# Patient Record
Sex: MALE | Race: WHITE | Employment: FULL TIME | URBAN - METROPOLITAN AREA
[De-identification: names, ages, dates, MRNs, and addresses within clinical notes are randomized per-mention and may not be internally consistent; named-entity substitution may affect disease eponyms.]

---

## 2017-01-09 ENCOUNTER — HOSPITAL ENCOUNTER (OUTPATIENT)
Dept: NEUROLOGY | Age: 42
Discharge: HOME | End: 2017-01-09
Attending: ORTHOPAEDIC SURGERY | Admitting: ORTHOPAEDIC SURGERY

## 2017-01-09 DIAGNOSIS — G56.00 CARPAL TUNNEL SYNDROME: ICD-10-CM

## 2018-11-08 ENCOUNTER — HOSPITAL ENCOUNTER (EMERGENCY)
Age: 43
Discharge: HOME | End: 2018-11-08
Attending: EMERGENCY MEDICINE
Payer: COMMERCIAL

## 2018-11-08 VITALS
HEIGHT: 72 IN | HEART RATE: 103 BPM | BODY MASS INDEX: 21.97 KG/M2 | SYSTOLIC BLOOD PRESSURE: 124 MMHG | TEMPERATURE: 98.2 F | WEIGHT: 162.2 LBS | RESPIRATION RATE: 12 BRPM | DIASTOLIC BLOOD PRESSURE: 77 MMHG | OXYGEN SATURATION: 98 %

## 2018-11-08 DIAGNOSIS — J06.9 VIRAL URI WITH COUGH: Primary | ICD-10-CM

## 2018-11-08 PROCEDURE — 99282 EMERGENCY DEPT VISIT SF MDM: CPT

## 2018-11-08 RX ORDER — AZITHROMYCIN 250 MG/1
TABLET, FILM COATED ORAL
Qty: 1 PACKET | Refills: 0 | Status: SHIPPED | OUTPATIENT
Start: 2018-11-08 | End: 2018-11-18

## 2018-11-08 RX ORDER — PSEUDOEPHEDRINE HCL 120 MG/1
120 TABLET, FILM COATED, EXTENDED RELEASE ORAL EVERY 12 HOURS PRN
Qty: 20 TABLET | Refills: 1 | Status: SHIPPED | OUTPATIENT
Start: 2018-11-08 | End: 2018-11-15

## 2018-11-09 NOTE — ED NOTES
Pt dc/d with instructions and rx's in stable condition, ambulatory to lobby. Home per ride.       Shirley Greenberg RN  11/08/18 2004

## 2018-11-09 NOTE — ED PROVIDER NOTES
EMERGENCY DEPARTMENT PHYSICIAN DOCUMENTATION      CHIEF COMPLAINT  Cough (congestion sinus pressure )      HISTORY OF PRESENT ILLNESS  Ludivina Alberto is a 44 y.o. male with complaint of Cough (congestion sinus pressure )    Onset of symptoms 8 days ago. Sore throat is sharp, worse with swallowing, no radiation. No throat discharge. Associated with some nasal congestion and facial pressure. Cough is mildly productive, no hemoptysis. No nathaniel fevers. REVIEW OF SYSTEMS  A full 10 point Review of Systems was performed and is negative aside from pertinent positives mentioned in HPI    ALLERGIES:  Allergies   Allergen Reactions    Vicodin [Hydrocodone-Acetaminophen] Nausea And Vomiting       PAST HISTORY  Past Medical History:   Diagnosis Date    H/O keloid of skin     right shoulder, removed surgically     Hyperlipidemia     Insulin pump status     Type 1 diabetes mellitus with diabetic neuropathy (HCC)     A1c 11.3 5/2016       Family History   Problem Relation Age of Onset    Cancer Mother         breast    Heart Disease Maternal Grandfather        No current facility-administered medications on file prior to encounter. Current Outpatient Prescriptions on File Prior to Encounter   Medication Sig Dispense Refill    naproxen (NAPROSYN) 500 MG tablet Take 1 tablet by mouth 2 times daily for 20 doses 20 tablet 0    LamoTRIgine (LAMICTAL PO) Take by mouth      HUMALOG 100 UNIT/ML injection vial       Multiple Vitamins-Minerals (THERAPEUTIC MULTIVITAMIN-MINERALS) tablet Take 1 tablet by mouth daily      Ascorbic Acid (VITAMIN C) 250 MG tablet Take 250 mg by mouth daily      atorvastatin (LIPITOR) 20 MG tablet Take 20 mg by mouth daily.  aspirin 81 MG chewable tablet Take 81 mg by mouth daily.  INSULIN INFUSION PUMP by Implant route. Humolog, adjustable- insulin pump left side of abdomen.          Social History   Substance Use Topics    Smoking status: Current Every Day

## 2020-01-28 ENCOUNTER — APPOINTMENT (OUTPATIENT)
Dept: GENERAL RADIOLOGY | Age: 45
End: 2020-01-28
Payer: COMMERCIAL

## 2020-01-28 ENCOUNTER — HOSPITAL ENCOUNTER (EMERGENCY)
Age: 45
Discharge: HOME | End: 2020-01-28
Attending: EMERGENCY MEDICINE
Payer: COMMERCIAL

## 2020-01-28 VITALS
RESPIRATION RATE: 16 BRPM | HEIGHT: 72 IN | TEMPERATURE: 98.9 F | WEIGHT: 165.9 LBS | SYSTOLIC BLOOD PRESSURE: 154 MMHG | BODY MASS INDEX: 22.47 KG/M2 | OXYGEN SATURATION: 100 % | HEART RATE: 98 BPM | DIASTOLIC BLOOD PRESSURE: 88 MMHG

## 2020-01-28 PROCEDURE — 99283 EMERGENCY DEPT VISIT LOW MDM: CPT

## 2020-01-28 PROCEDURE — 6370000000 HC RX 637 (ALT 250 FOR IP): Performed by: EMERGENCY MEDICINE

## 2020-01-28 PROCEDURE — 71046 X-RAY EXAM CHEST 2 VIEWS: CPT

## 2020-01-28 RX ORDER — OSELTAMIVIR PHOSPHATE 75 MG/1
75 CAPSULE ORAL 2 TIMES DAILY
Qty: 10 CAPSULE | Refills: 0 | Status: SHIPPED | OUTPATIENT
Start: 2020-01-28 | End: 2020-02-02

## 2020-01-28 RX ORDER — ACETAMINOPHEN 325 MG/1
650 TABLET ORAL ONCE
Status: COMPLETED | OUTPATIENT
Start: 2020-01-28 | End: 2020-01-28

## 2020-01-28 RX ORDER — OSELTAMIVIR PHOSPHATE 75 MG/1
75 CAPSULE ORAL ONCE
Status: COMPLETED | OUTPATIENT
Start: 2020-01-28 | End: 2020-01-28

## 2020-01-28 RX ADMIN — OSELTAMIVIR PHOSPHATE 75 MG: 75 CAPSULE ORAL at 19:23

## 2020-01-28 RX ADMIN — ACETAMINOPHEN 650 MG: 325 TABLET ORAL at 19:23

## 2020-01-28 ASSESSMENT — ENCOUNTER SYMPTOMS
BACK PAIN: 0
STRIDOR: 0
PHOTOPHOBIA: 0
NAUSEA: 0
COLOR CHANGE: 0
WHEEZING: 0
VOICE CHANGE: 0
VOMITING: 0
BLOOD IN STOOL: 0
FACIAL SWELLING: 0
SHORTNESS OF BREATH: 0
ABDOMINAL PAIN: 0
COUGH: 1
TROUBLE SWALLOWING: 0

## 2020-01-28 ASSESSMENT — PAIN SCALES - GENERAL: PAINLEVEL_OUTOF10: 3

## 2020-01-29 NOTE — ED PROVIDER NOTES
difficulty urinating and dysuria. Musculoskeletal: Positive for myalgias. Negative for back pain, gait problem and neck pain. Skin: Negative for color change and wound. Neurological: Negative for seizures, syncope and speech difficulty. Psychiatric/Behavioral: Negative for self-injury and suicidal ideas. Except as noted above the remainder of the review of systems was reviewed and negative. PAST MEDICAL HISTORY     Past Medical History:   Diagnosis Date    H/O keloid of skin     right shoulder, removed surgically     Hyperlipidemia     Insulin pump status     Type 1 diabetes mellitus with diabetic neuropathy (HCC)     A1c 11.3 5/2016         SURGICAL HISTORY       Past Surgical History:   Procedure Laterality Date    SHOULDER SURGERY      SHOULDER SURGERY      arthoscopy of left shoulder         CURRENT MEDICATIONS       Previous Medications    ASCORBIC ACID (VITAMIN C) 250 MG TABLET    Take 250 mg by mouth daily    ASPIRIN 81 MG CHEWABLE TABLET    Take 81 mg by mouth daily. ATORVASTATIN (LIPITOR) 20 MG TABLET    Take 20 mg by mouth daily. HUMALOG 100 UNIT/ML INJECTION VIAL        INSULIN INFUSION PUMP    by Implant route. Humolog, adjustable- insulin pump left side of abdomen.     LAMOTRIGINE (LAMICTAL PO)    Take by mouth    MULTIPLE VITAMINS-MINERALS (THERAPEUTIC MULTIVITAMIN-MINERALS) TABLET    Take 1 tablet by mouth daily    NAPROXEN (NAPROSYN) 500 MG TABLET    Take 1 tablet by mouth 2 times daily for 20 doses       ALLERGIES     Vicodin [hydrocodone-acetaminophen]    FAMILY HISTORY       Family History   Problem Relation Age of Onset    Cancer Mother         breast    Heart Disease Maternal Grandfather           SOCIAL HISTORY       Social History     Socioeconomic History    Marital status:      Spouse name: None    Number of children: None    Years of education: None    Highest education level: None   Occupational History    Occupation:    Social Needs    Financial resource strain: None    Food insecurity:     Worry: None     Inability: None    Transportation needs:     Medical: None     Non-medical: None   Tobacco Use    Smoking status: Current Every Day Smoker     Packs/day: 1.00     Years: 20.00     Pack years: 20.00     Types: Cigarettes    Smokeless tobacco: Never Used   Substance and Sexual Activity    Alcohol use: No     Comment: social    Drug use: No    Sexual activity: Yes     Partners: Female   Lifestyle    Physical activity:     Days per week: None     Minutes per session: None    Stress: None   Relationships    Social connections:     Talks on phone: None     Gets together: None     Attends Orthodox service: None     Active member of club or organization: None     Attends meetings of clubs or organizations: None     Relationship status: None    Intimate partner violence:     Fear of current or ex partner: None     Emotionally abused: None     Physically abused: None     Forced sexual activity: None   Other Topics Concern    None   Social History Narrative    ** Merged History Encounter **              PHYSICAL EXAM    (up to 7 for level 4, 8 or more for level 5)     ED Triage Vitals [01/28/20 1844]   BP Temp Temp Source Pulse Resp SpO2 Height Weight   (!) 154/88 98.9 °F (37.2 °C) Oral 98 16 100 % 6' (1.829 m) 165 lb 14.4 oz (75.3 kg)       Physical Exam  Vitals signs and nursing note reviewed. Constitutional:       General: He is not in acute distress. Appearance: He is well-developed. HENT:      Head: Normocephalic and atraumatic. Right Ear: External ear normal.      Left Ear: External ear normal.      Mouth/Throat:      Pharynx: Uvula midline. Posterior oropharyngeal erythema present. No pharyngeal swelling, oropharyngeal exudate or uvula swelling. Tonsils: No tonsillar exudate or tonsillar abscesses.    Eyes:      Conjunctiva/sclera: Conjunctivae normal.   Neck:      Musculoskeletal: Normal range of motion and respiratory manifestation other than pneumonia          DISPOSITION/PLAN   DISPOSITION  Discharge      PATIENT REFERRED TO:  Lizzette Garcia MD  [address] [address] [address]    In 1 week      [address] Emergency Department  [facility] [place]  [phone]    If symptoms worsen      DISCHARGE MEDICATIONS:  New Prescriptions    OSELTAMIVIR (TAMIFLU) 75 MG CAPSULE    Take 1 capsule by mouth 2 times daily for 5 days          (Please note that portions of this note were completed with a voice recognition program.  Efforts were made to edit the dictations but occasionally words aremis-transcribed. )    Melita Roberts MD (electronically signed)  Attending Emergency Physician     Melita Roberts MD  01/28/20 0163

## 2020-01-29 NOTE — ED NOTES
Pt dc/d with instructions and rx in stable condition[facility]       Alirio Gramajo RN  01/28/20 0723

## 2020-03-15 ENCOUNTER — HOSPITAL ENCOUNTER (EMERGENCY)
Age: 45
Discharge: HOME | End: 2020-03-15
Attending: EMERGENCY MEDICINE
Payer: COMMERCIAL

## 2020-03-15 VITALS
DIASTOLIC BLOOD PRESSURE: 74 MMHG | HEIGHT: 72 IN | TEMPERATURE: 98 F | RESPIRATION RATE: 16 BRPM | HEART RATE: 87 BPM | WEIGHT: 172.5 LBS | BODY MASS INDEX: 23.36 KG/M2 | OXYGEN SATURATION: 100 % | SYSTOLIC BLOOD PRESSURE: 115 MMHG

## 2020-03-15 PROCEDURE — 99282 EMERGENCY DEPT VISIT SF MDM: CPT

## 2020-03-15 RX ORDER — SULFAMETHOXAZOLE AND TRIMETHOPRIM 800; 160 MG/1; MG/1
1 TABLET ORAL 2 TIMES DAILY
Qty: 20 TABLET | Refills: 0 | Status: SHIPPED | OUTPATIENT
Start: 2020-03-15 | End: 2020-03-25

## 2020-03-15 RX ORDER — CEPHALEXIN 500 MG/1
500 CAPSULE ORAL 3 TIMES DAILY
Qty: 30 CAPSULE | Refills: 0 | Status: SHIPPED | OUTPATIENT
Start: 2020-03-15 | End: 2021-08-18

## 2021-08-18 ENCOUNTER — HOSPITAL ENCOUNTER (EMERGENCY)
Age: 46
Discharge: HOME | End: 2021-08-18
Payer: COMMERCIAL

## 2021-08-18 ENCOUNTER — APPOINTMENT (OUTPATIENT)
Dept: GENERAL RADIOLOGY | Age: 46
End: 2021-08-18
Payer: COMMERCIAL

## 2021-08-18 VITALS
RESPIRATION RATE: 20 BRPM | HEART RATE: 70 BPM | TEMPERATURE: 97.8 F | BODY MASS INDEX: 23.7 KG/M2 | DIASTOLIC BLOOD PRESSURE: 91 MMHG | SYSTOLIC BLOOD PRESSURE: 149 MMHG | WEIGHT: 175 LBS | HEIGHT: 72 IN | OXYGEN SATURATION: 100 %

## 2021-08-18 DIAGNOSIS — S82.891A CLOSED FRACTURE OF RIGHT ANKLE, INITIAL ENCOUNTER: Primary | ICD-10-CM
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
## 2021-08-18 DIAGNOSIS — S92.901A CLOSED FRACTURE OF RIGHT FOOT, INITIAL ENCOUNTER: ICD-10-CM

## 2021-08-18 PROCEDURE — 6370000000 HC RX 637 (ALT 250 FOR IP): Performed by: GENERAL ACUTE CARE HOSPITAL

## 2021-08-18 PROCEDURE — 6360000002 HC RX W HCPCS: Performed by: GENERAL ACUTE CARE HOSPITAL

## 2021-08-18 PROCEDURE — 73630 X-RAY EXAM OF FOOT: CPT

## 2021-08-18 PROCEDURE — 99285 EMERGENCY DEPT VISIT HI MDM: CPT

## 2021-08-18 PROCEDURE — 96372 THER/PROPH/DIAG INJ SC/IM: CPT

## 2021-08-18 PROCEDURE — 73610 X-RAY EXAM OF ANKLE: CPT

## 2021-08-18 PROCEDURE — 90715 TDAP VACCINE 7 YRS/> IM: CPT | Performed by: GENERAL ACUTE CARE HOSPITAL

## 2021-08-18 PROCEDURE — 90471 IMMUNIZATION ADMIN: CPT | Performed by: GENERAL ACUTE CARE HOSPITAL

## 2021-08-18 RX ORDER — OXYCODONE HYDROCHLORIDE AND ACETAMINOPHEN 5; 325 MG/1; MG/1
1 TABLET ORAL EVERY 6 HOURS PRN
Qty: 12 TABLET | Refills: 0 | Status: SHIPPED | OUTPATIENT
Start: 2021-08-18 | End: 2021-08-18 | Stop reason: SDUPTHER

## 2021-08-18 RX ORDER — ONDANSETRON 4 MG/1
4 TABLET, ORALLY DISINTEGRATING ORAL ONCE
Status: COMPLETED | OUTPATIENT
Start: 2021-08-18 | End: 2021-08-18

## 2021-08-18 RX ORDER — OXYCODONE HYDROCHLORIDE AND ACETAMINOPHEN 5; 325 MG/1; MG/1
1 TABLET ORAL ONCE
Status: COMPLETED | OUTPATIENT
Start: 2021-08-18 | End: 2021-08-18

## 2021-08-18 RX ORDER — IBUPROFEN 600 MG/1
600 TABLET ORAL EVERY 8 HOURS PRN
Qty: 30 TABLET | Refills: 0 | Status: ON HOLD | OUTPATIENT
Start: 2021-08-18 | End: 2022-08-12

## 2021-08-18 RX ORDER — OXYCODONE HYDROCHLORIDE AND ACETAMINOPHEN 5; 325 MG/1; MG/1
1 TABLET ORAL EVERY 6 HOURS PRN
Qty: 12 TABLET | Refills: 0 | Status: SHIPPED | OUTPATIENT
Start: 2021-08-18 | End: 2021-08-21

## 2021-08-18 RX ORDER — MORPHINE SULFATE 10 MG/ML
6 INJECTION, SOLUTION INTRAMUSCULAR; INTRAVENOUS ONCE
Status: COMPLETED | OUTPATIENT
Start: 2021-08-18 | End: 2021-08-18

## 2021-08-18 RX ORDER — CEPHALEXIN 500 MG/1
500 CAPSULE ORAL 4 TIMES DAILY
Qty: 28 CAPSULE | Refills: 0 | Status: SHIPPED | OUTPATIENT
Start: 2021-08-18 | End: 2021-08-25

## 2021-08-18 RX ADMIN — ONDANSETRON 4 MG: 4 TABLET, ORALLY DISINTEGRATING ORAL at 12:08

## 2021-08-18 RX ADMIN — TETANUS TOXOID, REDUCED DIPHTHERIA TOXOID AND ACELLULAR PERTUSSIS VACCINE, ADSORBED 0.5 ML: 5; 2.5; 8; 8; 2.5 SUSPENSION INTRAMUSCULAR at 12:09

## 2021-08-18 RX ADMIN — OXYCODONE HYDROCHLORIDE AND ACETAMINOPHEN 1 TABLET: 5; 325 TABLET ORAL at 14:02

## 2021-08-18 RX ADMIN — MORPHINE SULFATE 6 MG: 10 INJECTION INTRAVENOUS at 12:09

## 2021-08-18 ASSESSMENT — PAIN DESCRIPTION - ORIENTATION
ORIENTATION: RIGHT
ORIENTATION: RIGHT

## 2021-08-18 ASSESSMENT — PAIN SCALES - GENERAL
PAINLEVEL_OUTOF10: 3
PAINLEVEL_OUTOF10: 5
PAINLEVEL_OUTOF10: 6
PAINLEVEL_OUTOF10: 6
PAINLEVEL_OUTOF10: 5

## 2021-08-18 ASSESSMENT — PAIN DESCRIPTION - PAIN TYPE
TYPE: ACUTE PAIN
TYPE: ACUTE PAIN

## 2021-08-18 ASSESSMENT — PAIN - FUNCTIONAL ASSESSMENT: PAIN_FUNCTIONAL_ASSESSMENT: 0-10

## 2021-08-18 ASSESSMENT — PAIN DESCRIPTION - LOCATION
LOCATION: ANKLE
LOCATION: ANKLE

## 2021-08-18 NOTE — ED PROVIDER NOTES
[facility]        Pt Name: Inga Casas  MRN: [id number]  [place] 1975  Date of evaluation: 8/18/2021  Provider: MIKE Davis CNP  PCP: No primary care provider on file. Note Started: 1:04 PM EDT       TEAGAN. I have evaluated this patient. My supervising physician was available for consultation. CHIEF COMPLAINT       Chief Complaint   Patient presents with    Ankle Pain     arrived via EMS d/t smashed right foot in between 2 machines regular tennis shoe; hx of diabetes type 1       HISTORY OF PRESENT ILLNESS   (Location, Timing/Onset, Context/Setting, Quality, Duration, Modifying Factors, Severity, Associated Signs and Symptoms)  Note limiting factors. Chief Complaint: Work related right foot and ankle injury    Inga Casas is a 40 y.o. male who presents to the emergency department today for evaluation of a right lower extremity injury. Patient states that he was at work and accidentally had his right foot smashed between 2 pallets. He states that he was wearing his tennis shoes at the time. Patient states that he is unable to bear any weight on the affected extremity. He is unsure of his tetanus status. He currently reports a pain level of 8 out of 10. He describes his pain as constant dull and aching with sharp pains that occur with minimal movement. The pain does not radiate. He has not taken anything for his symptoms. He does report history of type 1 diabetes and states that he is compliant with his medications and diet. He states that he is otherwise felt well and has been without recent fever, chills, or other symptoms. Nursing Notes were all reviewed and agreed with or any disagreements were addressed in the HPI. REVIEW OF SYSTEMS    (2-9 systems for level 4, 10 or more for level 5)     Review of Systems   Constitutional: Negative for chills and fever.    HENT: Negative for congestion and sore throat. Eyes: Negative for visual disturbance. Respiratory: Negative for chest tightness and shortness of breath. Cardiovascular: Negative for chest pain and palpitations. Gastrointestinal: Negative for abdominal pain, nausea and vomiting. Endocrine: Negative for polydipsia and polyuria. Genitourinary: Negative for difficulty urinating and dysuria. Musculoskeletal: Positive for arthralgias, gait problem and joint swelling. Negative for back pain, neck pain and neck stiffness. Skin: Positive for wound. Negative for rash. Allergic/Immunologic: Negative for immunocompromised state. Neurological: Negative for dizziness, weakness and light-headedness. Psychiatric/Behavioral: Negative for hallucinations and suicidal ideas. Positives and Pertinent negatives as per HPI. Except as noted above in the ROS, all other systems were reviewed and negative. PAST MEDICAL HISTORY     Past Medical History:   Diagnosis Date    H/O keloid of skin     right shoulder, removed surgically     Hyperlipidemia     Insulin pump status     Type 1 diabetes mellitus with diabetic neuropathy (HCC)     A1c 11.3 5/2016         SURGICAL HISTORY     Past Surgical History:   Procedure Laterality Date    SHOULDER SURGERY      SHOULDER SURGERY      arthoscopy of left shoulder         CURRENTMEDICATIONS       Discharge Medication List as of 8/18/2021  1:49 PM      CONTINUE these medications which have NOT CHANGED    Details   HUMALOG 100 UNIT/ML injection vial PREETHI      Multiple Vitamins-Minerals (THERAPEUTIC MULTIVITAMIN-MINERALS) tablet Take 1 tablet by mouth daily      Ascorbic Acid (VITAMIN C) 250 MG tablet Take 250 mg by mouth daily      atorvastatin (LIPITOR) 20 MG tablet Take 20 mg by mouth daily. aspirin 81 MG chewable tablet Take 81 mg by mouth daily. INSULIN INFUSION PUMP by Implant route. Humolog, adjustable- insulin pump left side of abdomen.                ALLERGIES     Vicodin [hydrocodone-acetaminophen]    FAMILYHISTORY       Family History   Problem Relation Age of Onset    Cancer Mother         breast    Heart Disease Maternal Grandfather           SOCIAL HISTORY       Social History     Tobacco Use    Smoking status: Current Every Day Smoker     Packs/day: 1.00     Years: 20.00     Pack years: 20.00     Types: Cigarettes    Smokeless tobacco: Never Used   Substance Use Topics    Alcohol use: No     Comment: social    Drug use: No       SCREENINGS             PHYSICAL EXAM    (up to 7 for level 4, 8 or more for level 5)     ED Triage Vitals [08/18/21 1137]   BP Temp Temp Source Pulse Resp SpO2 Height Weight   (!) 146/79 97.8 °F (36.6 °C) Oral 70 20 100 % 6' (1.829 m) 175 lb (79.4 kg)       Physical Exam  Vitals and nursing note reviewed. Constitutional:       General: He is in acute distress. Appearance: Normal appearance. He is diaphoretic. He is not ill-appearing or toxic-appearing. HENT:      Head: Normocephalic and atraumatic. Right Ear: External ear normal.      Left Ear: External ear normal.      Nose: Nose normal.   Eyes:      General:         Right eye: No discharge. Left eye: No discharge. Extraocular Movements: Extraocular movements intact. Cardiovascular:      Rate and Rhythm: Normal rate and regular rhythm. Pulses: Normal pulses. Heart sounds: Normal heart sounds. Pulmonary:      Effort: Pulmonary effort is normal. No respiratory distress. Breath sounds: Normal breath sounds. Abdominal:      General: Bowel sounds are normal.      Palpations: Abdomen is soft. Tenderness: There is no abdominal tenderness. Musculoskeletal:      Cervical back: Normal range of motion and neck supple. Right ankle: Swelling present. No deformity or ecchymosis. Tenderness present. No base of 5th metatarsal or proximal fibula tenderness. Decreased range of motion. Right foot: Decreased range of motion. Normal capillary refill. nondisplaced, obliquely oriented fracture of the distal fibula with   associated soft tissue swelling. 2. Acute, minimally displaced fracture involving the base of the 2nd   metatarsal.   3. Acute, nondisplaced obliquely oriented fracture involving the distal   aspect of the 3rd metatarsal.   4. Acute, nondisplaced chip fracture involving the medial margin of the   medial cuneiform. 5. Joint alignment is maintained. XR ANKLE RIGHT (MIN 3 VIEWS)    Result Date: 8/18/2021  EXAMINATION: THREE XRAY VIEWS OF THE RIGHT ANKLE; THREE XRAY VIEWS OF THE RIGHT FOOT 8/18/2021 12:10 pm COMPARISON: None. HISTORY: ORDERING SYSTEM PROVIDED HISTORY: injury TECHNOLOGIST PROVIDED HISTORY: Reason for exam:->injury Reason for Exam: Ankle Pain (arrived via EMS d/t smashed right foot in between 2 machines regular tennis shoe; hx of diabetes type 1) Acuity: Acute Type of Exam: Initial Acute traumatic pain of the right ankle and the right foot FINDINGS: Right ankle: A subtle, nondisplaced obliquely oriented fracture is present involving the distal fibula. This is best seen on the lateral film. There is moderate lateral soft tissue swelling noted. No evidence of osteochondral lesion. The joint alignment and joint spaces are maintained. No erosions are present. Right foot: There is an acute, minimally displaced fracture involving the base of the 2nd metatarsal, with intra-articular extension to the tarsometatarsal joint. Small, acute appearing fracture is present involving the medial margin of the medial cuneiform, at the 1st MTP joint. Nondisplaced, obliquely oriented fracture involving the distal aspect of the 3rd metatarsal. The joint alignment and joint spaces are maintained. No erosions are present. 1. Acute, nondisplaced, obliquely oriented fracture of the distal fibula with associated soft tissue swelling.  2. Acute, minimally displaced fracture involving the base of the 2nd metatarsal. 3. Acute, nondisplaced obliquely oriented fracture involving the distal aspect of the 3rd metatarsal. 4. Acute, nondisplaced chip fracture involving the medial margin of the medial cuneiform. 5. Joint alignment is maintained. XR FOOT RIGHT (MIN 3 VIEWS)    Result Date: 8/18/2021  EXAMINATION: THREE XRAY VIEWS OF THE RIGHT ANKLE; THREE XRAY VIEWS OF THE RIGHT FOOT 8/18/2021 12:10 pm COMPARISON: None. HISTORY: ORDERING SYSTEM PROVIDED HISTORY: injury TECHNOLOGIST PROVIDED HISTORY: Reason for exam:->injury Reason for Exam: Ankle Pain (arrived via EMS d/t smashed right foot in between 2 machines regular tennis shoe; hx of diabetes type 1) Acuity: Acute Type of Exam: Initial Acute traumatic pain of the right ankle and the right foot FINDINGS: Right ankle: A subtle, nondisplaced obliquely oriented fracture is present involving the distal fibula. This is best seen on the lateral film. There is moderate lateral soft tissue swelling noted. No evidence of osteochondral lesion. The joint alignment and joint spaces are maintained. No erosions are present. Right foot: There is an acute, minimally displaced fracture involving the base of the 2nd metatarsal, with intra-articular extension to the tarsometatarsal joint. Small, acute appearing fracture is present involving the medial margin of the medial cuneiform, at the 1st MTP joint. Nondisplaced, obliquely oriented fracture involving the distal aspect of the 3rd metatarsal. The joint alignment and joint spaces are maintained. No erosions are present. 1. Acute, nondisplaced, obliquely oriented fracture of the distal fibula with associated soft tissue swelling. 2. Acute, minimally displaced fracture involving the base of the 2nd metatarsal. 3. Acute, nondisplaced obliquely oriented fracture involving the distal aspect of the 3rd metatarsal. 4. Acute, nondisplaced chip fracture involving the medial margin of the medial cuneiform. 5. Joint alignment is maintained.            PROCEDURES   Unless otherwise noted below, none     Procedures    CRITICAL CARE TIME   N/A    CONSULTS:  None      EMERGENCY DEPARTMENT COURSE and DIFFERENTIAL DIAGNOSIS/MDM:   Vitals:    Vitals:    08/18/21 1137 08/18/21 1145 08/18/21 1200 08/18/21 1215   BP: (!) 146/79 (!) 132/91 132/86 (!) 149/91   Pulse: 70      Resp: 20      Temp: 97.8 °F (36.6 °C)      TempSrc: Oral      SpO2: 100% 100% 100% 100%   Weight: 175 lb (79.4 kg)      Height: 6' (1.829 m)          Patient was given the following medications:  Medications   ondansetron (ZOFRAN-ODT) disintegrating tablet 4 mg (4 mg Oral Given 8/18/21 1208)   morphine injection 6 mg (6 mg Intramuscular Given 8/18/21 1209)   Tetanus-Diphth-Acell Pertussis (BOOSTRIX) injection 0.5 mL (0.5 mLs Intramuscular Given 8/18/21 1209)   oxyCODONE-acetaminophen (PERCOCET) 5-325 MG per tablet 1 tablet (1 tablet Oral Given 8/18/21 1402)         Previous records reviewed in order to gain further information regarding patient's PMH as well as his HPI. Nursing notes reviewed. This is a 71-year-old  male with history of type 1 diabetes who presents to the emergency department today for evaluation after a right lower extremity injury which occurred while at work just prior to arrival.  Physical exam complete. Patient arrives nontoxic, afebrile, mildly hypertensive. He does appear uncomfortable. Patient is medicated with Zofran ODT and IM morphine. Tetanus is updated. Right ankle and foot x-rays interpreted by radiologist and reviewed by myself as above. Consulted with orthopedic physician Dr. Ingrid Rosenbaum who advises that patient may be seen as outpatient in his office tomorrow. He requests that a long orthopedic boot be provided. He is to be nonweightbearing on crutches. At this time there is no evidence of any life-threatening or emergent conditions requiring immediate intervention. No evidence of compartment syndrome. He will be discharged with emphasis on close outpatient follow-up. Prescriptions for Percocet, ibuprofen and Keflex are provided. He agrees to follow-up as directed. He agrees return for high fever, incessant vomiting, severe pain, any other worsening symptoms. He is discharged in stable condition. FINAL IMPRESSION      1. Closed fracture of right ankle, initial encounter    2. Closed fracture of right foot, initial encounter    3. Work related injury          DISPOSITION/PLAN   DISPOSITION Decision To Discharge 08/18/2021 01:22:41 PM      PATIENT REFERRED TO:  Lorie Morales MD  [address] [facility], [address] [place] [address]  [phone]    Today  Call orthopedics office today. He will see you tomorrow in his office. DISCHARGE MEDICATIONS:  Discharge Medication List as of 8/18/2021  1:49 PM      START taking these medications    Details   ibuprofen (ADVIL;MOTRIN) 600 MG tablet Take 1 tablet by mouth every 8 hours as needed for Pain, Disp-30 tablet, R-0Print      oxyCODONE-acetaminophen (PERCOCET) 5-325 MG per tablet Take 1 tablet by mouth every 6 hours as needed for Pain for up to 3 days. Intended supply: 3 days.  Take lowest dose possible to manage pain, Disp-12 tablet, R-0Normal             DISCONTINUED MEDICATIONS:  Discharge Medication List as of 8/18/2021  1:49 PM      STOP taking these medications       naproxen (NAPROSYN) 500 MG tablet Comments:   Reason for Stopping:         LamoTRIgine (LAMICTAL PO) Comments:   Reason for Stopping:                      (Please note that portions of this note were completed with a voice recognition program.  Efforts were made to edit the dictations but occasionally words are mis-transcribed.)    MIKE Perez CNP (electronically signed)            MIKE Perez CNP  08/20/21 5781

## 2021-08-18 NOTE — ED NOTES
Bed: 10  Expected date:   Expected time:   Means of arrival: [facility]  Comments:     Starla Blake RN  08/18/21 1133

## 2021-08-19 ENCOUNTER — OFFICE VISIT (OUTPATIENT)
Dept: ORTHOPEDIC SURGERY | Age: 46
End: 2021-08-19
Payer: COMMERCIAL

## 2021-08-19 VITALS — WEIGHT: 175 LBS | BODY MASS INDEX: 23.7 KG/M2 | HEIGHT: 72 IN

## 2021-08-19 DIAGNOSIS — S92.321A CLOSED DISPLACED FRACTURE OF SECOND METATARSAL BONE OF RIGHT FOOT, INITIAL ENCOUNTER: ICD-10-CM

## 2021-08-19 DIAGNOSIS — S82.64XA CLOSED NONDISPLACED FRACTURE OF LATERAL MALLEOLUS OF RIGHT FIBULA, INITIAL ENCOUNTER: Primary | ICD-10-CM

## 2021-08-19 DIAGNOSIS — F17.200 CURRENT SMOKER: ICD-10-CM

## 2021-08-19 DIAGNOSIS — S92.241A CLOSED DISPLACED FRACTURE OF MEDIAL CUNEIFORM OF RIGHT FOOT, INITIAL ENCOUNTER: ICD-10-CM

## 2021-08-19 DIAGNOSIS — S92.334A CLOSED NONDISPLACED FRACTURE OF THIRD METATARSAL BONE OF RIGHT FOOT, INITIAL ENCOUNTER: ICD-10-CM

## 2021-08-19 PROCEDURE — G8420 CALC BMI NORM PARAMETERS: HCPCS | Performed by: ORTHOPAEDIC SURGERY

## 2021-08-19 PROCEDURE — 28450 TX TARSAL B1 FX W/O MNPJ EA: CPT | Performed by: ORTHOPAEDIC SURGERY

## 2021-08-19 PROCEDURE — 99203 OFFICE O/P NEW LOW 30 MIN: CPT | Performed by: ORTHOPAEDIC SURGERY

## 2021-08-19 PROCEDURE — 4004F PT TOBACCO SCREEN RCVD TLK: CPT | Performed by: ORTHOPAEDIC SURGERY

## 2021-08-19 PROCEDURE — 27786 TREATMENT OF ANKLE FRACTURE: CPT | Performed by: ORTHOPAEDIC SURGERY

## 2021-08-19 PROCEDURE — G8427 DOCREV CUR MEDS BY ELIG CLIN: HCPCS | Performed by: ORTHOPAEDIC SURGERY

## 2021-08-19 PROCEDURE — 99406 BEHAV CHNG SMOKING 3-10 MIN: CPT | Performed by: ORTHOPAEDIC SURGERY

## 2021-08-20 RX ORDER — HYDROCODONE BITARTRATE AND ACETAMINOPHEN 5; 325 MG/1; MG/1
1 TABLET ORAL EVERY 6 HOURS PRN
Qty: 20 TABLET | Refills: 0 | Status: SHIPPED | OUTPATIENT
Start: 2021-08-20 | End: 2021-08-25

## 2021-08-20 ASSESSMENT — ENCOUNTER SYMPTOMS
SHORTNESS OF BREATH: 0
ABDOMINAL PAIN: 0
VOMITING: 0
BACK PAIN: 0
CHEST TIGHTNESS: 0
NAUSEA: 0
SORE THROAT: 0

## 2021-08-20 NOTE — TELEPHONE ENCOUNTER
Prescription Refill     Medication Name:  Oxycodone   Pharmacy: [facility] on [address]   Patient Contact Number:  [phone] or [phone]

## 2021-09-05 PROBLEM — S82.64XA CLOSED NONDISPLACED FRACTURE OF LATERAL MALLEOLUS OF RIGHT FIBULA: Status: ACTIVE | Noted: 2021-09-05

## 2021-09-05 PROBLEM — S92.334A CLOSED NONDISPLACED FRACTURE OF THIRD METATARSAL BONE OF RIGHT FOOT: Status: ACTIVE | Noted: 2021-09-05

## 2021-09-05 PROBLEM — S92.321A CLOSED DISPLACED FRACTURE OF SECOND METATARSAL BONE OF RIGHT FOOT: Status: ACTIVE | Noted: 2021-09-05

## 2021-09-05 PROBLEM — F17.200 CURRENT SMOKER: Status: ACTIVE | Noted: 2021-09-05

## 2021-09-05 PROBLEM — S92.241A CLOSED DISPLACED FRACTURE OF MEDIAL CUNEIFORM OF RIGHT FOOT: Status: ACTIVE | Noted: 2021-09-05

## 2021-09-05 NOTE — PROGRESS NOTES
CHIEF COMPLAINT:   1- Right ankle pain / lateral malleolus minimally displaced fracture. 2- Right foot pain/ 2nd MT base, 3rd MT distal shaft, medial cuneiform avulsion fracture. DATE OF INJURY: 8/18/2021, DOT 8/19/2021. HISTORY:  Mr. Yvette Garrett 40 y.o.  male presents today for the first visit for evaluation of a right ankle and foot injury which occurred when he wedged his right foot between pallet keyona wit a sharon type injury. He was first seen and evaluated in  ED, where he was x-rayed, splinted and asked to f/u with Orthopedics. He is complaining of right lateral ankle, midfoot and forefoot pain and swelling. This is better with elevation and worse with bearing any wt. The pain is sharp and not radiating 7/10. No numbness or tingling sensation. Alleviating factors: elevation and rest. No other complaint. He is a smoker.     Past Medical History:   Diagnosis Date    H/O keloid of skin     right shoulder, removed surgically     Hyperlipidemia     Insulin pump status     Type 1 diabetes mellitus with diabetic neuropathy (HCC)     A1c 11.3 5/2016       Past Surgical History:   Procedure Laterality Date    SHOULDER SURGERY      SHOULDER SURGERY      arthoscopy of left shoulder       Social History     Socioeconomic History    Marital status:      Spouse name: Not on file    Number of children: Not on file    Years of education: Not on file    Highest education level: Not on file   Occupational History    Occupation:    Tobacco Use    Smoking status: Current Every Day Smoker     Packs/day: 1.00     Years: 20.00     Pack years: 20.00     Types: Cigarettes    Smokeless tobacco: Never Used   Substance and Sexual Activity    Alcohol use: No     Comment: social    Drug use: No    Sexual activity: Yes     Partners: Female   Other Topics Concern    Not on file   Social History Narrative    ** Merged History Encounter **          Social Determinants of Health     Financial Resource Strain:     Difficulty of Paying Living Expenses:    Food Insecurity:     Worried About Running Out of Food in the Last Year:     [address] in the Last Year:    Transportation Needs:     Lack of Transportation (Medical):  Lack of Transportation (Non-Medical):    Physical Activity:     Days of Exercise per Week:     Minutes of Exercise per Session:    Stress:     Feeling of Stress :    Social Connections:     Frequency of Communication with Friends and Family:     Frequency of Social Gatherings with Friends and Family:     Attends Jain Services:     Active Member of Clubs or Organizations:     Attends Club or Organization Meetings:     Marital Status:    Intimate Partner Violence:     Fear of Current or Ex-Partner:     Emotionally Abused:     Physically Abused:     Sexually Abused:        Family History   Problem Relation Age of Onset    Cancer Mother         breast    Heart Disease Maternal Grandfather        Current Outpatient Medications on File Prior to Visit   Medication Sig Dispense Refill    ibuprofen (ADVIL;MOTRIN) 600 MG tablet Take 1 tablet by mouth every 8 hours as needed for Pain 30 tablet 0    HUMALOG 100 UNIT/ML injection vial       Multiple Vitamins-Minerals (THERAPEUTIC MULTIVITAMIN-MINERALS) tablet Take 1 tablet by mouth daily      Ascorbic Acid (VITAMIN C) 250 MG tablet Take 250 mg by mouth daily      atorvastatin (LIPITOR) 20 MG tablet Take 20 mg by mouth daily.  aspirin 81 MG chewable tablet Take 81 mg by mouth daily.  INSULIN INFUSION PUMP by Implant route. Humolog, adjustable- insulin pump left side of abdomen. No current facility-administered medications on file prior to visit. Pertinent items are noted in HPI  Review of systems reviewed from Patient History Form dated on 8/19/2021 and available in the patient's chart under the Media tab. No change. PHYSICAL EXAMINATION:  Mr. Yvette Garrett is a very pleasant 40 y.o.  male who presents today in no acute distress, awake, alert, and oriented. He is well dressed, nourished and  groomed. Patient with normal affect. Height is  6' (1.829 m), weight is 175 lb (79.4 kg), Body mass index is 23.73 kg/m². Resting respiratory rate is 16. Examination of the gait, showed that the patient walks with a crutch, NWB right leg and in a splint . Examination of both ankles showing a decreased range of motion of the right ankle compare to the other side. There is moderate swelling that can be seen, as well as ecchymosis. He has intact sensation and good pedal pulses. He has significant tenderness on deep palpation over the lateral malleolus of the right ankle, and right foot 2nd MT base, 3rd MT distal shaft, and medial cuneiform area. IMAGING: Xrays, 3 views of the right ankle and foot dated 8/18/2021 from [facility],  were reviewed, and showed a minimally displaced lateral malleolus fracture, and 2nd MT base, 3rd MT distal shaft, medial cuneiform avulsion fracture. IMPRESSION:   1- Right ankle pain / lateral malleolus minimally displaced fracture. 2- Right foot pain/ 2nd MT base, 3rd MT distal shaft, medial cuneiform avulsion fracture. PLAN:  I discussed that the overall alignment of these fractures are good and that we can try to treat this non-operatively in a boot NWB. We discussed the risk of nonunion and or malunion. We re-applied a boot today in the office and instructed him  in care. We will see him  back in 6 weeks at which time we will get a new xray of the right ankle and foot. The patient smokes, and we discussed with the patient the risks of smoking on general health and also on bone and soft tissue healing (delay and non-union), and promised to cut down or stop smoking. Smoking: Educated the patient regarding the hazards of smoking and that it harms their body in many ways.  It increases the chance of developing heart disease, lung disease, cancer, and other health problems including poor bone and wound healing. The importance of smoking cessation for optimal bone and wound healing was stressed. This was communicated verbally, 5 Minutes.       Kinza Mast MD

## 2021-09-30 ENCOUNTER — OFFICE VISIT (OUTPATIENT)
Dept: ORTHOPEDIC SURGERY | Age: 46
End: 2021-09-30

## 2021-09-30 VITALS — WEIGHT: 175 LBS | BODY MASS INDEX: 23.7 KG/M2 | HEIGHT: 72 IN

## 2021-09-30 DIAGNOSIS — S92.334A CLOSED NONDISPLACED FRACTURE OF THIRD METATARSAL BONE OF RIGHT FOOT, INITIAL ENCOUNTER: ICD-10-CM

## 2021-09-30 DIAGNOSIS — S82.64XA CLOSED NONDISPLACED FRACTURE OF LATERAL MALLEOLUS OF RIGHT FIBULA, INITIAL ENCOUNTER: Primary | ICD-10-CM

## 2021-09-30 DIAGNOSIS — S92.241A CLOSED DISPLACED FRACTURE OF MEDIAL CUNEIFORM OF RIGHT FOOT, INITIAL ENCOUNTER: ICD-10-CM

## 2021-09-30 DIAGNOSIS — S92.321A CLOSED DISPLACED FRACTURE OF SECOND METATARSAL BONE OF RIGHT FOOT, INITIAL ENCOUNTER: ICD-10-CM

## 2021-09-30 PROCEDURE — APPNB15 APP NON BILLABLE TIME 0-15 MINS: Performed by: NURSE PRACTITIONER

## 2021-09-30 PROCEDURE — 99024 POSTOP FOLLOW-UP VISIT: CPT | Performed by: NURSE PRACTITIONER

## 2021-09-30 NOTE — PROGRESS NOTES
CHIEF COMPLAINT:   1- Right ankle pain / lateral malleolus minimally displaced fracture. 2- Right foot pain/ 2nd MT base, 3rd MT distal shaft, medial cuneiform avulsion fracture. DATE OF INJURY: 8/18/2021, DOT 8/19/2021. HISTORY:  Mr. David Mcneill 40 y.o.  male presents today for follow up visit for evaluation of a right ankle and foot injury which occurred when he wedged his right foot between pallet keyona wit a sharon type injury. He was first seen and evaluated in  ED, where he was x-rayed, splinted and asked to f/u with Orthopedics. He is in a boot NWB. He rates his pain a 1/10 VAS and is mild achy intermittent and improving. Pain is worse with increased activity and no pain with rest. He states he is wearing his boot most of the time and in bed and is stiff. No numbness or tingling sensation. No other complaint. He is a smoker.     Past Medical History:   Diagnosis Date    H/O keloid of skin     right shoulder, removed surgically     Hyperlipidemia     Insulin pump status     Type 1 diabetes mellitus with diabetic neuropathy (HCC)     A1c 11.3 5/2016       Past Surgical History:   Procedure Laterality Date    SHOULDER SURGERY      SHOULDER SURGERY      arthoscopy of left shoulder       Social History     Socioeconomic History    Marital status:      Spouse name: Not on file    Number of children: Not on file    Years of education: Not on file    Highest education level: Not on file   Occupational History    Occupation:    Tobacco Use    Smoking status: Current Every Day Smoker     Packs/day: 1.00     Years: 20.00     Pack years: 20.00     Types: Cigarettes    Smokeless tobacco: Never Used   Vaping Use    Vaping Use: Former   Substance and Sexual Activity    Alcohol use: Yes     Comment: social, occas    Drug use: No    Sexual activity: Yes     Partners: Female   Other Topics Concern    Not on file   Social History Narrative    ** Merged History Encounter ** EXAMINATION:  Mr. Asia Langley is a very pleasant 40 y.o.  male who presents today in no acute distress, awake, alert, and oriented. He is well dressed, nourished and  groomed. Patient with normal affect. Height is  6' (1.829 m), weight is 175 lb (79.4 kg), Body mass index is 23.73 kg/m². Resting respiratory rate is 16. Examination of the gait, showed that the patient walks with a knee scooter, NWB right leg and in a boot . Examination of both ankles showing a decreased range of motion of the right ankle compare to the other side. There is mild swelling that can be seen, no ecchymosis. He has intact sensation and good pedal pulses. He has mild tenderness on deep palpation over the lateral malleolus of the right ankle, and right foot 2nd MT base, 3rd MT distal shaft, and medial cuneiform area. IMAGING: Xrays, 3 views of the right ankle and foot dated today in office,  were reviewed, and showed a minimally displaced lateral malleolus fracture, and 2nd MT base, 3rd MT distal shaft, medial cuneiform avulsion fracture. IMPRESSION:   1- Right ankle pain / lateral malleolus minimally displaced fracture. 2- Right foot pain/ 2nd MT base, 3rd MT distal shaft, medial cuneiform avulsion fracture. PLAN:  I discussed that the overall alignment of these fractures are good. He will continue in a  boot NWB for another 2 weeks, then WB in a boot for 2 weeks, then discontinue the boot. We discussed the risk of nonunion and or malunion. We re-applied a boot today in the office and instructed him  in care. We will see him  back in 6 weeks at which time we will get a new xray of the right ankle and foot. The patient smokes, and we discussed with the patient the risks of smoking on general health and also on bone and soft tissue healing (delay and non-union), and promised to cut down or stop smoking.      Smoking: Educated the patient regarding the hazards of smoking and that it harms their body in many ways. It increases the chance of developing heart disease, lung disease, cancer, and other health problems including poor bone and wound healing. The importance of smoking cessation for optimal bone and wound healing was stressed. This was communicated verbally, 5 Minutes.       MIKE Hart - CNP

## 2021-11-02 ENCOUNTER — OFFICE VISIT (OUTPATIENT)
Dept: ORTHOPEDIC SURGERY | Age: 46
End: 2021-11-02

## 2021-11-02 VITALS — RESPIRATION RATE: 16 BRPM | WEIGHT: 175 LBS | HEIGHT: 72 IN | BODY MASS INDEX: 23.7 KG/M2

## 2021-11-02 DIAGNOSIS — S82.64XA CLOSED NONDISPLACED FRACTURE OF LATERAL MALLEOLUS OF RIGHT FIBULA, INITIAL ENCOUNTER: ICD-10-CM

## 2021-11-02 DIAGNOSIS — S92.241A CLOSED DISPLACED FRACTURE OF MEDIAL CUNEIFORM OF RIGHT FOOT, INITIAL ENCOUNTER: ICD-10-CM

## 2021-11-02 DIAGNOSIS — M25.571 PAIN IN JOINT INVOLVING ANKLE AND FOOT, RIGHT: Primary | ICD-10-CM

## 2021-11-02 DIAGNOSIS — S92.321A CLOSED DISPLACED FRACTURE OF SECOND METATARSAL BONE OF RIGHT FOOT, INITIAL ENCOUNTER: ICD-10-CM

## 2021-11-02 DIAGNOSIS — S92.334A CLOSED NONDISPLACED FRACTURE OF THIRD METATARSAL BONE OF RIGHT FOOT, INITIAL ENCOUNTER: ICD-10-CM

## 2021-11-02 PROCEDURE — 99024 POSTOP FOLLOW-UP VISIT: CPT | Performed by: NURSE PRACTITIONER

## 2021-11-02 PROCEDURE — APPNB15 APP NON BILLABLE TIME 0-15 MINS: Performed by: NURSE PRACTITIONER

## 2021-11-02 NOTE — LETTER
[facility]  [address] [place] [address]  Phone: [phone]  Fax: [phone]    Zo Gibson MD        November 2, 2021     Patient: Theresa Santizo   YOB: 1975   Date of Visit: 11/2/2021       To Whom It May Concern: It is my medical opinion that Theresa Santizo may return to work on 11-8-21 full duty. If you have any questions or concerns, please don't hesitate to call.     Sincerely,          Zo Gibson MD

## 2021-11-02 NOTE — PROGRESS NOTES
CHIEF COMPLAINT:   1- Right ankle pain / lateral malleolus minimally displaced fracture. 2- Right foot pain/ 2nd MT base, 3rd MT distal shaft, medial cuneiform avulsion fracture. DATE OF INJURY: 8/18/2021, DOT 8/19/2021. HISTORY:  Mr. Clarice Gilford 40 y.o.  male presents today for follow up visit for evaluation of a right ankle and foot injury which occurred when he wedged his right foot between pallet keyona with a crush type injury. He was first seen and evaluated in  ED, where he was x-rayed, splinted and asked to f/u with Orthopedics. He has been WB and discontinued the boot a couple weeks ago. He rates his pain a 1/10 VAS and is mild achy intermittent and improving. Pain is worse with increased activity and no pain with rest.  He states his stiffness is starting to improve. No numbness or tingling sensation. No other complaint. He is a smoker.     Past Medical History:   Diagnosis Date    H/O keloid of skin     right shoulder, removed surgically     Hyperlipidemia     Insulin pump status     Type 1 diabetes mellitus with diabetic neuropathy (HCC)     A1c 11.3 5/2016       Past Surgical History:   Procedure Laterality Date    SHOULDER SURGERY      SHOULDER SURGERY      arthoscopy of left shoulder       Social History     Socioeconomic History    Marital status:      Spouse name: Not on file    Number of children: Not on file    Years of education: Not on file    Highest education level: Not on file   Occupational History    Occupation:    Tobacco Use    Smoking status: Current Every Day Smoker     Packs/day: 1.00     Years: 20.00     Pack years: 20.00     Types: Cigarettes    Smokeless tobacco: Never Used   Vaping Use    Vaping Use: Former   Substance and Sexual Activity    Alcohol use: Yes     Comment: social, occas    Drug use: No    Sexual activity: Yes     Partners: Female   Other Topics Concern    Not on file   Social History Narrative    ** Merged History Encounter **          Social Determinants of Health     Financial Resource Strain:     Difficulty of Paying Living Expenses:    Food Insecurity:     Worried About Running Out of Food in the Last Year:     [address] in the Last Year:    Transportation Needs:     Lack of Transportation (Medical):  Lack of Transportation (Non-Medical):    Physical Activity:     Days of Exercise per Week:     Minutes of Exercise per Session:    Stress:     Feeling of Stress :    Social Connections:     Frequency of Communication with Friends and Family:     Frequency of Social Gatherings with Friends and Family:     Attends Anglican Services:     Active Member of Clubs or Organizations:     Attends Club or Organization Meetings:     Marital Status:    Intimate Partner Violence:     Fear of Current or Ex-Partner:     Emotionally Abused:     Physically Abused:     Sexually Abused:        Family History   Problem Relation Age of Onset    Cancer Mother         breast    Heart Disease Maternal Grandfather        Current Outpatient Medications on File Prior to Visit   Medication Sig Dispense Refill    ibuprofen (ADVIL;MOTRIN) 600 MG tablet Take 1 tablet by mouth every 8 hours as needed for Pain 30 tablet 0    HUMALOG 100 UNIT/ML injection vial       Multiple Vitamins-Minerals (THERAPEUTIC MULTIVITAMIN-MINERALS) tablet Take 1 tablet by mouth daily      Ascorbic Acid (VITAMIN C) 250 MG tablet Take 250 mg by mouth daily      atorvastatin (LIPITOR) 20 MG tablet Take 20 mg by mouth daily.  aspirin 81 MG chewable tablet Take 81 mg by mouth daily.  INSULIN INFUSION PUMP by Implant route. Humolog, adjustable- insulin pump left side of abdomen. No current facility-administered medications on file prior to visit. Pertinent items are noted in HPI  Review of systems reviewed from Patient History Form dated on 8/19/2021 and available in the patient's chart under the Media tab. No change. PHYSICAL EXAMINATION:  Mr. Valorie Morales is a very pleasant 40 y.o.  male who presents today in no acute distress, awake, alert, and oriented. He is well dressed, nourished and  groomed. Patient with normal affect. Height is  6' (1.829 m), weight is 175 lb (79.4 kg), Body mass index is 23.73 kg/m². Resting respiratory rate is 16. Examination of the gait, showed that the patient walks with no limp, WB right leg. Examination of both ankles showing a decreased range of motion of the right ankle compare to the other side. There is mild swelling that can be seen, no ecchymosis. He has intact sensation and good pedal pulses. He has mild tenderness on deep palpation over the lateral malleolus of the right ankle, and right foot 2nd MT base, 3rd MT distal shaft, and medial cuneiform area. IMAGING: Xrays, 3 views of the right ankle and foot dated today in office,  were reviewed, and showed a minimally displaced lateral malleolus fracture, and 2nd MT base, 3rd MT distal shaft, medial cuneiform avulsion fracture. IMPRESSION:   1- Right ankle pain / lateral malleolus minimally displaced fracture. 2- Right foot pain/ 2nd MT base, 3rd MT distal shaft, medial cuneiform avulsion fracture. PLAN:  I discussed that the overall alignment of these fractures are good. He can be WB and gradually return to normal activities as tolerated. We discussed the risk of nonunion and or malunion. No heavy impact activities. We will see him  back in 6 weeks at which time we will get a new xray of the right ankle and foot. The patient smokes, and we discussed with the patient the risks of smoking on general health and also on bone and soft tissue healing (delay and non-union), and promised to cut down or stop smoking. Smoking: Educated the patient regarding the hazards of smoking and that it harms their body in many ways.  It increases the chance of developing heart disease, lung disease, cancer, and other health problems including poor bone and wound healing. The importance of smoking cessation for optimal bone and wound healing was stressed. This was communicated verbally, 5 Minutes.       MIKE Limon - CNP

## 2021-12-08 ENCOUNTER — HOSPITAL ENCOUNTER (EMERGENCY)
Age: 46
Discharge: HOME | End: 2021-12-08
Payer: COMMERCIAL

## 2021-12-08 VITALS
BODY MASS INDEX: 22.38 KG/M2 | HEART RATE: 91 BPM | SYSTOLIC BLOOD PRESSURE: 148 MMHG | DIASTOLIC BLOOD PRESSURE: 95 MMHG | RESPIRATION RATE: 18 BRPM | TEMPERATURE: 98 F | WEIGHT: 165 LBS | OXYGEN SATURATION: 98 %

## 2021-12-08 DIAGNOSIS — J06.9 ACUTE UPPER RESPIRATORY INFECTION: Primary | ICD-10-CM

## 2021-12-08 DIAGNOSIS — Z20.822 COVID-19 VIRUS TEST RESULT UNKNOWN: ICD-10-CM

## 2021-12-08 PROCEDURE — U0005 INFEC AGEN DETEC AMPLI PROBE: HCPCS

## 2021-12-08 PROCEDURE — U0003 INFECTIOUS AGENT DETECTION BY NUCLEIC ACID (DNA OR RNA); SEVERE ACUTE RESPIRATORY SYNDROME CORONAVIRUS 2 (SARS-COV-2) (CORONAVIRUS DISEASE [COVID-19]), AMPLIFIED PROBE TECHNIQUE, MAKING USE OF HIGH THROUGHPUT TECHNOLOGIES AS DESCRIBED BY CMS-2020-01-R: HCPCS

## 2021-12-08 PROCEDURE — 99283 EMERGENCY DEPT VISIT LOW MDM: CPT

## 2021-12-08 ASSESSMENT — ENCOUNTER SYMPTOMS
TROUBLE SWALLOWING: 0
SHORTNESS OF BREATH: 0
SORE THROAT: 1
COUGH: 1
ABDOMINAL PAIN: 0
NAUSEA: 0
CHEST TIGHTNESS: 0
DIARRHEA: 0
VOMITING: 0

## 2021-12-08 NOTE — ED PROVIDER NOTES
[address] [facility] ENCOUNTER          NURSE PRACTITIONER NOTE       Date of evaluation: 12/8/2021    Chief Complaint     Nasal Congestion (Dad staes that he has a cough, congestion, sneezing and sore throat. denies N/V/D/F.)      History of Present Illness     Luba Morley is a 40 y.o. male post medical history of type 1 diabetes, hyperlipidemia; who presents to the emergency department with a complaint of nonproductive cough, congestion, sneezing and sore throat for the past week. Patient denies associated nausea vomiting diarrhea, fevers chills or sweats. States that he was concerned due to continuation of symptoms. Does state that he had a full vaccination series earlier this year. Has not had a booster. Has been taking over-the-counter cough medication, and Tylenol as needed. Review of Systems     Review of Systems   Constitutional: Negative for fatigue and fever. HENT: Positive for congestion, sneezing and sore throat. Negative for trouble swallowing. Respiratory: Positive for cough. Negative for chest tightness and shortness of breath. Cardiovascular: Negative. Gastrointestinal: Negative for abdominal pain, diarrhea, nausea and vomiting. Genitourinary: Negative. Musculoskeletal: Negative for arthralgias and myalgias. Skin: Negative. Allergic/Immunologic: Negative for immunocompromised state. Hematological: Does not bruise/bleed easily. Psychiatric/Behavioral: Negative. Past Medical, Surgical, Family, and Social History     He has a past medical history of H/O keloid of skin, Hyperlipidemia, Insulin pump status, and Type 1 diabetes mellitus with diabetic neuropathy ([facility] [address] He has a past surgical history that includes shoulder surgery and shoulder surgery. His family history includes Cancer in his mother; Heart Disease in his maternal grandfather. He reports that he has been smoking cigarettes. He has a 20.00 pack-year smoking history.  He has never used smokeless tobacco. He reports current alcohol use. He reports that he does not use drugs. Medications     Previous Medications    ASCORBIC ACID (VITAMIN C) 250 MG TABLET    Take 250 mg by mouth daily    ASPIRIN 81 MG CHEWABLE TABLET    Take 81 mg by mouth daily. ATORVASTATIN (LIPITOR) 20 MG TABLET    Take 20 mg by mouth daily. HUMALOG 100 UNIT/ML INJECTION VIAL        IBUPROFEN (ADVIL;MOTRIN) 600 MG TABLET    Take 1 tablet by mouth every 8 hours as needed for Pain    INSULIN INFUSION PUMP    by Implant route. Humolog, adjustable- insulin pump left side of abdomen. MULTIPLE VITAMINS-MINERALS (THERAPEUTIC MULTIVITAMIN-MINERALS) TABLET    Take 1 tablet by mouth daily       Allergies     He is allergic to vicodin [hydrocodone-acetaminophen]. Physical Exam     INITIAL VITALS: BP: (!) 148/95, Temp: 98 °F (36.7 °C), Pulse: 91, Resp: 18, SpO2: 98 %   Physical Exam  Vitals and nursing note reviewed. Constitutional:       Appearance: Normal appearance. Comments: Patient sitting on stretcher drinking soda and eating chips; NAD noted   HENT:      Head: Normocephalic and atraumatic. Mouth/Throat:      Mouth: Mucous membranes are moist.      Pharynx: Oropharynx is clear. No oropharyngeal exudate or posterior oropharyngeal erythema. Cardiovascular:      Rate and Rhythm: Normal rate and regular rhythm. Pulses: Normal pulses. Heart sounds: Normal heart sounds. Pulmonary:      Effort: Pulmonary effort is normal. No respiratory distress. Breath sounds: Normal breath sounds. No wheezing, rhonchi or rales. Musculoskeletal:         General: Normal range of motion. Cervical back: Normal range of motion and neck supple. Skin:     General: Skin is warm and dry. Capillary Refill: Capillary refill takes less than 2 seconds. Neurological:      Mental Status: He is alert and oriented to person, place, and time.    Psychiatric:         Mood and Affect: Mood normal. medications on file       DISPOSITION Discharge - Pending Orders Complete 12/08/2021 04:38:11 PM           MIKE Mac - CNP  12/08/21 [id number]

## 2021-12-09 LAB — SARS-COV-2: NOT DETECTED

## 2021-12-14 ENCOUNTER — OFFICE VISIT (OUTPATIENT)
Dept: ORTHOPEDIC SURGERY | Age: 46
End: 2021-12-14
Payer: COMMERCIAL

## 2021-12-14 DIAGNOSIS — S82.64XA CLOSED NONDISPLACED FRACTURE OF LATERAL MALLEOLUS OF RIGHT FIBULA, INITIAL ENCOUNTER: Primary | ICD-10-CM

## 2021-12-14 DIAGNOSIS — F17.200 CURRENT SMOKER: ICD-10-CM

## 2021-12-14 DIAGNOSIS — S92.334A CLOSED NONDISPLACED FRACTURE OF THIRD METATARSAL BONE OF RIGHT FOOT, INITIAL ENCOUNTER: ICD-10-CM

## 2021-12-14 DIAGNOSIS — S92.241A CLOSED DISPLACED FRACTURE OF MEDIAL CUNEIFORM OF RIGHT FOOT, INITIAL ENCOUNTER: ICD-10-CM

## 2021-12-14 PROCEDURE — 4004F PT TOBACCO SCREEN RCVD TLK: CPT | Performed by: ORTHOPAEDIC SURGERY

## 2021-12-14 PROCEDURE — G8484 FLU IMMUNIZE NO ADMIN: HCPCS | Performed by: ORTHOPAEDIC SURGERY

## 2021-12-14 PROCEDURE — 99406 BEHAV CHNG SMOKING 3-10 MIN: CPT | Performed by: ORTHOPAEDIC SURGERY

## 2021-12-14 PROCEDURE — 99213 OFFICE O/P EST LOW 20 MIN: CPT | Performed by: ORTHOPAEDIC SURGERY

## 2021-12-14 PROCEDURE — G8427 DOCREV CUR MEDS BY ELIG CLIN: HCPCS | Performed by: ORTHOPAEDIC SURGERY

## 2021-12-14 PROCEDURE — G8420 CALC BMI NORM PARAMETERS: HCPCS | Performed by: ORTHOPAEDIC SURGERY

## 2021-12-14 NOTE — PROGRESS NOTES
CHIEF COMPLAINT:   1- Right ankle pain / lateral malleolus minimally displaced fracture. 2- Right foot pain/ 2nd MT base, 3rd MT distal shaft, medial cuneiform avulsion fracture. DATE OF INJURY: 8/18/2021, DOT 8/19/2021. HISTORY:  Mr. Joey Avina 40 y.o.  male presents today for follow up visit for evaluation of a right ankle and foot injury which occurred when he wedged his right foot between pallet keyona with a crush type injury. He was first seen and evaluated in  ED, where he was x-rayed, splinted and asked to f/u with Orthopedics. He has been WB and doing much better. He rates his pain a 0/10 VAS  He states his stiffness is starting to improve. No numbness or tingling sensation. No other complaint. He is a smoker and has been having difficulty stopping.     Past Medical History:   Diagnosis Date    H/O keloid of skin     right shoulder, removed surgically     Hyperlipidemia     Insulin pump status     Type 1 diabetes mellitus with diabetic neuropathy (HCC)     A1c 11.3 5/2016       Past Surgical History:   Procedure Laterality Date    SHOULDER SURGERY      SHOULDER SURGERY      arthoscopy of left shoulder       Social History     Socioeconomic History    Marital status:      Spouse name: Not on file    Number of children: Not on file    Years of education: Not on file    Highest education level: Not on file   Occupational History    Occupation:    Tobacco Use    Smoking status: Current Every Day Smoker     Packs/day: 1.00     Years: 20.00     Pack years: 20.00     Types: Cigarettes    Smokeless tobacco: Never Used   Vaping Use    Vaping Use: Former   Substance and Sexual Activity    Alcohol use: Yes     Comment: social, occas    Drug use: No    Sexual activity: Yes     Partners: Female   Other Topics Concern    Not on file   Social History Narrative    ** Merged History Encounter **          Social Determinants of Health     Financial Resource Strain:     Difficulty of Paying Living Expenses: Not on file   Food Insecurity:     Worried About Running Out of Food in the Last Year: Not on file    Ran Out of Food in the Last Year: Not on file   Transportation Needs:     Lack of Transportation (Medical): Not on file    Lack of Transportation (Non-Medical): Not on file   Physical Activity:     Days of Exercise per Week: Not on file    Minutes of Exercise per Session: Not on file   Stress:     Feeling of Stress : Not on file   Social Connections:     Frequency of Communication with Friends and Family: Not on file    Frequency of Social Gatherings with Friends and Family: Not on file    Attends Rastafarian Services: Not on file    Active Member of [address] [place] [address] [employer] or Organizations: Not on file    Attends Club or Organization Meetings: Not on file    Marital Status: Not on file   Intimate Partner Violence:     Fear of Current or Ex-Partner: Not on file    Emotionally Abused: Not on file    Physically Abused: Not on file    Sexually Abused: Not on file   Housing Stability:     Unable to Pay for Housing in the Last Year: Not on file    Number of [place] in the Last Year: Not on file    Unstable Housing in the Last Year: Not on file       Family History   Problem Relation Age of Onset    Cancer Mother         breast    Heart Disease Maternal Grandfather        Current Outpatient Medications on File Prior to Visit   Medication Sig Dispense Refill    ibuprofen (ADVIL;MOTRIN) 600 MG tablet Take 1 tablet by mouth every 8 hours as needed for Pain 30 tablet 0    HUMALOG 100 UNIT/ML injection vial       Multiple Vitamins-Minerals (THERAPEUTIC MULTIVITAMIN-MINERALS) tablet Take 1 tablet by mouth daily      Ascorbic Acid (VITAMIN C) 250 MG tablet Take 250 mg by mouth daily      atorvastatin (LIPITOR) 20 MG tablet Take 20 mg by mouth daily.  aspirin 81 MG chewable tablet Take 81 mg by mouth daily.  INSULIN INFUSION PUMP by Implant route.  Humolog, adjustable- insulin pump left side of abdomen. No current facility-administered medications on file prior to visit. Pertinent items are noted in HPI  Review of systems reviewed from Patient History Form dated on 8/19/2021 and available in the patient's chart under the Media tab. No change. PHYSICAL EXAMINATION:  Mr. Lenin Reyes is a very pleasant 40 y.o.  male who presents today in no acute distress, awake, alert, and oriented. He is well dressed, nourished and  groomed. Patient with normal affect. Height is   , weight is  , There is no height or weight on file to calculate BMI. Resting respiratory rate is 16. Examination of the gait, showed that the patient walks with no limp, WB right leg. Examination of both ankles showing a full range of motion of the right ankle compare to the other side. There is no swelling that can be seen, no ecchymosis. He has intact sensation and good pedal pulses. He has no tenderness on deep palpation over the lateral malleolus of the right ankle, and right foot 2nd MT base, 3rd MT distal shaft, and medial cuneiform area. IMAGING: Xrays, 3 views of the right ankle and foot dated today in office,  were reviewed, and showed a minimally displaced lateral malleolus fracture, and 2nd MT base, 3rd MT distal shaft, medial cuneiform avulsion fracture. IMPRESSION:   1- Right ankle pain / lateral malleolus minimally displaced fracture. 2- Right foot pain/ 2nd MT base, 3rd MT distal shaft, medial cuneiform avulsion fracture. PLAN:  I discussed that the overall alignment of these fractures are good. ROM and peroneal exercises. He can go back to normal activity with no restrictions. NSAIDs OTC. He will be seen PRN. I told the patient that it is not unusual to have some achy pain and swelling for up to a year after a fracture.     The patient smokes, and we discussed with the patient the risks of smoking on general health and also on bone and soft tissue healing (delay and non-union), and promised to cut down or stop smoking. Smoking: Educated the patient regarding the hazards of smoking and that it harms their body in many ways. It increases the chance of developing heart disease, lung disease, cancer, and other health problems including poor bone and wound healing. The importance of smoking cessation for optimal bone and wound healing was stressed. This was communicated verbally, 5 Minutes.       Raissa Verma MD

## 2021-12-15 ENCOUNTER — APPOINTMENT (OUTPATIENT)
Dept: GENERAL RADIOLOGY | Age: 46
End: 2021-12-15
Payer: COMMERCIAL

## 2021-12-15 ENCOUNTER — HOSPITAL ENCOUNTER (EMERGENCY)
Age: 46
Discharge: LEFT BEFORE DISCHARGE | End: 2021-12-15
Attending: EMERGENCY MEDICINE
Payer: COMMERCIAL

## 2021-12-15 VITALS
DIASTOLIC BLOOD PRESSURE: 74 MMHG | SYSTOLIC BLOOD PRESSURE: 136 MMHG | TEMPERATURE: 97.9 F | RESPIRATION RATE: 20 BRPM | OXYGEN SATURATION: 96 % | HEART RATE: 91 BPM

## 2021-12-15 PROCEDURE — U0003 INFECTIOUS AGENT DETECTION BY NUCLEIC ACID (DNA OR RNA); SEVERE ACUTE RESPIRATORY SYNDROME CORONAVIRUS 2 (SARS-COV-2) (CORONAVIRUS DISEASE [COVID-19]), AMPLIFIED PROBE TECHNIQUE, MAKING USE OF HIGH THROUGHPUT TECHNOLOGIES AS DESCRIBED BY CMS-2020-01-R: HCPCS

## 2021-12-15 PROCEDURE — U0005 INFEC AGEN DETEC AMPLI PROBE: HCPCS

## 2021-12-15 PROCEDURE — 71046 X-RAY EXAM CHEST 2 VIEWS: CPT

## 2021-12-15 PROCEDURE — 4500000002 HC ER NO CHARGE

## 2021-12-16 LAB — SARS-COV-2: NOT DETECTED

## 2022-05-25 ENCOUNTER — HOSPITAL ENCOUNTER (EMERGENCY)
Age: 47
Discharge: HOME | End: 2022-05-25
Payer: COMMERCIAL

## 2022-05-25 ENCOUNTER — APPOINTMENT (OUTPATIENT)
Dept: GENERAL RADIOLOGY | Age: 47
End: 2022-05-25
Payer: COMMERCIAL

## 2022-05-25 VITALS
HEART RATE: 93 BPM | OXYGEN SATURATION: 99 % | SYSTOLIC BLOOD PRESSURE: 118 MMHG | DIASTOLIC BLOOD PRESSURE: 82 MMHG | TEMPERATURE: 98.2 F | RESPIRATION RATE: 16 BRPM

## 2022-05-25 DIAGNOSIS — S90.31XA CONTUSION OF RIGHT FOOT, INITIAL ENCOUNTER: Primary | ICD-10-CM

## 2022-05-25 DIAGNOSIS — S90.811A ABRASION OF RIGHT FOOT, INITIAL ENCOUNTER: ICD-10-CM

## 2022-05-25 PROCEDURE — 73630 X-RAY EXAM OF FOOT: CPT

## 2022-05-25 PROCEDURE — 6370000000 HC RX 637 (ALT 250 FOR IP): Performed by: PHYSICIAN ASSISTANT

## 2022-05-25 PROCEDURE — 99283 EMERGENCY DEPT VISIT LOW MDM: CPT

## 2022-05-25 RX ORDER — TRAMADOL HYDROCHLORIDE 50 MG/1
50 TABLET ORAL ONCE
Status: COMPLETED | OUTPATIENT
Start: 2022-05-25 | End: 2022-05-25

## 2022-05-25 RX ORDER — BACITRACIN, NEOMYCIN, POLYMYXIN B 400; 3.5; 5 [USP'U]/G; MG/G; [USP'U]/G
OINTMENT TOPICAL
Status: DISCONTINUED
Start: 2022-05-25 | End: 2022-05-25 | Stop reason: HOSPADM

## 2022-05-25 RX ORDER — NAPROXEN 500 MG/1
500 TABLET ORAL 2 TIMES DAILY
Qty: 20 TABLET | Refills: 0 | Status: SHIPPED | OUTPATIENT
Start: 2022-05-25 | End: 2022-05-31

## 2022-05-25 RX ORDER — HYDROCODONE BITARTRATE AND ACETAMINOPHEN 5; 325 MG/1; MG/1
1 TABLET ORAL ONCE
Status: DISCONTINUED | OUTPATIENT
Start: 2022-05-25 | End: 2022-05-25

## 2022-05-25 RX ADMIN — TRAMADOL HYDROCHLORIDE 50 MG: 50 TABLET, COATED ORAL at 14:00

## 2022-05-25 ASSESSMENT — ENCOUNTER SYMPTOMS
COLOR CHANGE: 0
SHORTNESS OF BREATH: 0

## 2022-05-25 ASSESSMENT — LIFESTYLE VARIABLES: HOW OFTEN DO YOU HAVE A DRINK CONTAINING ALCOHOL: NEVER

## 2022-05-25 ASSESSMENT — PAIN SCALES - GENERAL: PAINLEVEL_OUTOF10: 8

## 2022-05-25 NOTE — ED PROVIDER NOTES
[facility]        Pt Name: Vika Frances  MRN: [id number]  [place] 1975  Date of evaluation: 5/25/2022  Provider: Raf Chairez PA-C  PCP: No primary care provider on file. Note Started: 2:28 PM EDT       TEAGAN. I have evaluated this patient. My supervising physician was available for consultation. CHIEF COMPLAINT       Chief Complaint   Patient presents with    Foot Pain     pallet keyona came back and landed on R foot, swelling noted to top of foot       HISTORY OF PRESENT ILLNESS   (Location, Timing/Onset, Context/Setting, Quality, Duration, Modifying Factors, Severity, Associated Signs and Symptoms)  Note limiting factors. Chief Complaint: Right foot pain    Vika Frances is a 40 y.o. male who presents to the emergency department with right foot pain status post blunt trauma which occurred this afternoon. Patient states that he was at home and this is not work-related. Patient states that a pallet keyona accidentally fell on the right foot. He has increased pain when he bears weight. Denies numbness, tingling or weakness. Does have a small abrasion on top of the foot. He states that his tetanus is up-to-date. Nursing Notes were all reviewed and agreed with or any disagreements were addressed in the HPI. REVIEW OF SYSTEMS    (2-9 systems for level 4, 10 or more for level 5)     Review of Systems   Constitutional: Negative for chills and fever. Respiratory: Negative for shortness of breath. Cardiovascular: Negative for chest pain. Musculoskeletal: Positive for myalgias. Skin: Positive for wound. Negative for color change, pallor and rash. Neurological: Negative for weakness and numbness. All other systems reviewed and are negative. Positives and Pertinent negatives as per HPI. Except as noted above in the ROS, all other systems were reviewed and negative.        PAST MEDICAL HISTORY Past Medical History:   Diagnosis Date    H/O keloid of skin     right shoulder, removed surgically     Hyperlipidemia     Insulin pump status     Type 1 diabetes mellitus with diabetic neuropathy (HCC)     A1c 11.3 5/2016         SURGICAL HISTORY     Past Surgical History:   Procedure Laterality Date    SHOULDER SURGERY      SHOULDER SURGERY      arthoscopy of left shoulder         CURRENTMEDICATIONS       Previous Medications    ASCORBIC ACID (VITAMIN C) 250 MG TABLET    Take 250 mg by mouth daily    ASPIRIN 81 MG CHEWABLE TABLET    Take 81 mg by mouth daily. ATORVASTATIN (LIPITOR) 20 MG TABLET    Take 20 mg by mouth daily. HUMALOG 100 UNIT/ML INJECTION VIAL        IBUPROFEN (ADVIL;MOTRIN) 600 MG TABLET    Take 1 tablet by mouth every 8 hours as needed for Pain    INSULIN INFUSION PUMP    by Implant route. Humolog, adjustable- insulin pump left side of abdomen.     MULTIPLE VITAMINS-MINERALS (THERAPEUTIC MULTIVITAMIN-MINERALS) TABLET    Take 1 tablet by mouth daily         ALLERGIES     Vicodin [hydrocodone-acetaminophen]    FAMILYHISTORY       Family History   Problem Relation Age of Onset    Cancer Mother         breast    Heart Disease Maternal Grandfather           SOCIAL HISTORY       Social History     Tobacco Use    Smoking status: Current Every Day Smoker     Packs/day: 1.00     Years: 20.00     Pack years: 20.00     Types: Cigarettes    Smokeless tobacco: Never Used   Vaping Use    Vaping Use: Former   Substance Use Topics    Alcohol use: Yes     Comment: social, occas    Drug use: No       SCREENINGS    [place] Coma Scale  Eye Opening: Spontaneous  Best Verbal Response: Oriented  Best Motor Response: Obeys commands  [place] Coma Scale Score: 15        PHYSICAL EXAM    (up to 7 for level 4, 8 or more for level 5)     ED Triage Vitals [05/25/22 1341]   BP Temp Temp src Heart Rate Resp SpO2 Height Weight   118/82 98.2 °F (36.8 °C) -- 93 16 99 % -- --       Physical Exam  Vitals and nursing note reviewed. Constitutional:       Appearance: Normal appearance. He is well-developed. He is not toxic-appearing or diaphoretic. HENT:      Head: Normocephalic and atraumatic. Right Ear: External ear normal.      Left Ear: External ear normal.      Nose: Nose normal.      Mouth/Throat:      Mouth: Mucous membranes are moist.      Pharynx: Oropharynx is clear. Eyes:      General:         Right eye: No discharge. Left eye: No discharge. Cardiovascular:      Rate and Rhythm: Normal rate. Pulses:           Dorsalis pedis pulses are 2+ on the right side. Posterior tibial pulses are 2+ on the right side. Pulmonary:      Effort: Pulmonary effort is normal.      Breath sounds: Normal breath sounds. Musculoskeletal:         General: Normal range of motion. Cervical back: Normal range of motion. Right hip: Normal.      Left hip: Normal.      Right knee: Normal.      Left knee: Normal.      Right lower leg: Normal.      Left lower leg: Normal.      Right ankle: Normal.      Right Achilles Tendon: Normal.      Left ankle: Normal.      Left Achilles Tendon: Normal.      Right foot: Normal range of motion and normal capillary refill. Swelling and tenderness present. No deformity, bunion, Charcot foot, foot drop, prominent metatarsal heads, laceration, bony tenderness or crepitus. Normal pulse. Left foot: Normal.        Feet:    Skin:     General: Skin is warm and dry. Capillary Refill: Capillary refill takes less than 2 seconds. Coloration: Skin is not jaundiced or pale. Findings: No erythema, lesion or rash. Neurological:      General: No focal deficit present. Mental Status: He is alert and oriented to person, place, and time. Sensory: No sensory deficit. Motor: No weakness.       Deep Tendon Reflexes: Reflexes normal.   Psychiatric:         Behavior: Behavior normal.         DIAGNOSTIC RESULTS   LABS:    Labs Reviewed - No data to display    When ordered only abnormal lab results are displayed. All other labs were within normal range or not returned as of this dictation. EKG: When ordered, EKG's are interpreted by the Emergency Department Physician in the absence of a cardiologist.  Please see their note for interpretation of EKG. RADIOLOGY:   Non-plain film images such as CT, Ultrasound and MRI are read by the radiologist. Plain radiographic images are visualized and preliminarily interpreted by the ED Provider with the below findings:        Interpretation per the Radiologist below, if available at the time of this note:    XR FOOT RIGHT (MIN 3 VIEWS)   Final Result   No acute osseous abnormality. XR FOOT RIGHT (MIN 3 VIEWS)    Result Date: 5/25/2022  EXAMINATION: XRAY VIEWS OF THE RIGHT FOOT 5/25/2022 2:11 pm COMPARISON: 12/14/2021 HISTORY: ORDERING SYSTEM PROVIDED HISTORY: INJURY TECHNOLOGIST PROVIDED HISTORY: Reason for exam:->INJURY Reason for Exam: Foot Pain (pallet keyona came back and landed on R foot, swelling noted to top of foot) FINDINGS: There is no evidence of acute fracture. There is normal alignment of the tarsometatarsal joints. No acute joint abnormality. No focal osseous lesion. Soft tissue swelling dorsally. No acute osseous abnormality. PROCEDURES   Unless otherwise noted below, none     Procedures    CRITICAL CARE TIME   n/a    CONSULTS:  None      EMERGENCY DEPARTMENT COURSE and DIFFERENTIAL DIAGNOSIS/MDM:   Vitals:    Vitals:    05/25/22 1341   BP: 118/82   Pulse: 93   Resp: 16   Temp: 98.2 °F (36.8 °C)   SpO2: 99%       Patient was given the following medications:  Medications   traMADol (ULTRAM) tablet 50 mg (50 mg Oral Given 5/25/22 1400)         Is this patient to be included in the SEP-1 Core Measure due to severe sepsis or septic shock? No   Exclusion criteria - the patient is NOT to be included for SEP-1 Core Measure due to:   Infection is not suspected    This patient presents to the emergency department with small abrasion and contusion on right foot status post accidental blunt trauma. This is not work-related. Motor and sensory function are preserved. Tetanus up-to-date. Wound care applied. Will be sent home with anti-inflammatory. Advised to follow-up with PCP for recheck and may return to ED per discharge instructions. My suspicion is low for subungual hematoma, paronychia, eponychia, felon, flexor tenosynovitis, PE, foreign body, tendon rupture, compartment syndrome, acute fracture, dislocation, DVT, arterial compromise or occlusion, limb ischemia, gout, septic joint, abscess, cellulitis, osteomyelitis, gonococcal arthritis, SCFE, avascular necrosis, Osgood-Schlatter syndrome, or other concerning pathology. FINAL IMPRESSION      1. Contusion of right foot, initial encounter    2.  Abrasion of right foot, initial encounter          DISPOSITION/PLAN   DISPOSITION Decision To Discharge 05/25/2022 02:27:57 PM      PATIENT REFERRED TO:  [facility]  [place] [id number] Emergency Department  [address] [place] [address]  [phone]    If symptoms worsen      DISCHARGE MEDICATIONS:  New Prescriptions    NAPROXEN (NAPROSYN) 500 MG TABLET    Take 1 tablet by mouth 2 times daily for 20 doses       DISCONTINUED MEDICATIONS:  Discontinued Medications    No medications on file              (Please note that portions of this note were completed with a voice recognition program.  Efforts were made to edit the dictations but occasionally words are mis-transcribed.)    Serge Ashford PA-C (electronically signed)           Serge Ashford PA-C  05/25/22 [id number]

## 2022-05-31 ENCOUNTER — OFFICE VISIT (OUTPATIENT)
Dept: FAMILY MEDICINE CLINIC | Age: 47
End: 2022-05-31
Payer: COMMERCIAL

## 2022-05-31 VITALS
BODY MASS INDEX: 21.89 KG/M2 | WEIGHT: 161.6 LBS | HEIGHT: 72 IN | HEART RATE: 78 BPM | DIASTOLIC BLOOD PRESSURE: 58 MMHG | OXYGEN SATURATION: 97 % | TEMPERATURE: 97.4 F | SYSTOLIC BLOOD PRESSURE: 100 MMHG

## 2022-05-31 DIAGNOSIS — E78.2 MIXED HYPERLIPIDEMIA: ICD-10-CM

## 2022-05-31 DIAGNOSIS — Z82.41 FAMILY HISTORY OF SUDDEN CARDIAC DEATH: ICD-10-CM

## 2022-05-31 DIAGNOSIS — E10.69 TYPE 1 DIABETES MELLITUS WITH OTHER SPECIFIED COMPLICATION (HCC): ICD-10-CM

## 2022-05-31 DIAGNOSIS — L08.9 WOUND INFECTION: Primary | ICD-10-CM

## 2022-05-31 DIAGNOSIS — F17.200 CURRENT SMOKER: ICD-10-CM

## 2022-05-31 DIAGNOSIS — Z12.11 ENCOUNTER FOR SCREENING FOR MALIGNANT NEOPLASM OF COLON: ICD-10-CM

## 2022-05-31 DIAGNOSIS — T14.8XXA WOUND INFECTION: Primary | ICD-10-CM

## 2022-05-31 DIAGNOSIS — S99.921D INJURY OF RIGHT FOOT, SUBSEQUENT ENCOUNTER: ICD-10-CM

## 2022-05-31 PROBLEM — F32.A DEPRESSION: Status: ACTIVE | Noted: 2017-12-11

## 2022-05-31 PROBLEM — F41.9 ANXIETY: Status: ACTIVE | Noted: 2017-12-11

## 2022-05-31 PROCEDURE — 99214 OFFICE O/P EST MOD 30 MIN: CPT | Performed by: FAMILY MEDICINE

## 2022-05-31 RX ORDER — BLOOD-GLUCOSE SENSOR
EACH MISCELLANEOUS
COMMUNITY
Start: 2021-02-25

## 2022-05-31 RX ORDER — CEPHALEXIN 500 MG/1
500 CAPSULE ORAL 2 TIMES DAILY
Qty: 14 CAPSULE | Refills: 0 | Status: ON HOLD | OUTPATIENT
Start: 2022-05-31 | End: 2022-06-07 | Stop reason: HOSPADM

## 2022-05-31 RX ORDER — BLOOD-GLUCOSE,RECEIVER,CONT
EACH MISCELLANEOUS
COMMUNITY
Start: 2021-02-25

## 2022-05-31 RX ORDER — BLOOD-GLUCOSE TRANSMITTER
EACH MISCELLANEOUS
COMMUNITY
Start: 2021-02-25

## 2022-05-31 SDOH — ECONOMIC STABILITY: FOOD INSECURITY: WITHIN THE PAST 12 MONTHS, THE FOOD YOU BOUGHT JUST DIDN'T LAST AND YOU DIDN'T HAVE MONEY TO GET MORE.: NEVER TRUE

## 2022-05-31 SDOH — ECONOMIC STABILITY: FOOD INSECURITY: WITHIN THE PAST 12 MONTHS, YOU WORRIED THAT YOUR FOOD WOULD RUN OUT BEFORE YOU GOT MONEY TO BUY MORE.: NEVER TRUE

## 2022-05-31 ASSESSMENT — PATIENT HEALTH QUESTIONNAIRE - PHQ9
2. FEELING DOWN, DEPRESSED OR HOPELESS: 0
1. LITTLE INTEREST OR PLEASURE IN DOING THINGS: 0
SUM OF ALL RESPONSES TO PHQ QUESTIONS 1-9: 0
SUM OF ALL RESPONSES TO PHQ9 QUESTIONS 1 & 2: 0

## 2022-05-31 ASSESSMENT — SOCIAL DETERMINANTS OF HEALTH (SDOH): HOW HARD IS IT FOR YOU TO PAY FOR THE VERY BASICS LIKE FOOD, HOUSING, MEDICAL CARE, AND HEATING?: NOT HARD AT ALL

## 2022-05-31 NOTE — PROGRESS NOTES
Portions of this chart may have been created with voice recognition software. Occasional wrong-word or \"sound-alike\" substitutions may have occurred due to the inherent limitations of voice recognition software. Read the chart carefully and recognize, using context, where these substitutions have occurred        Chief Complaint     New Patient (est) and Other (colonscopy referral)               Shaunna Walker is a 40 y.o. male here for establishing care with me      1. Wound infection  2. Injury of right foot, subsequent encounter  Patient had a foot injury approximately a week ago. He had had abrasive wound on top of his right foot as well as bruising and swelling. He states the swelling has gone down about half but still continues to have pain and wound appears to be more irritated. No fever no chills no other symptoms at this time. Wound appears to have a cellulitis and infection and then. Will start him on an antibiotic and recommended to call back if symptoms do not improve or worsen. 3. Type 1 diabetes mellitus with other specified complication ([facility] [address]        History of type 1 diabetes mellitus currently managed by endocrinologist.  Last A1c was 10.1. Next appointment with endocrinologist is in . Remote history of neuropathy. Regularly       has vision exams every year. 4. Mixed hyperlipidemia  5. Family history of sudden cardiac death        10. Current smoker  Continues to be on statin for his hyperlipidemia. Denies any intolerance to statins. Family history of sudden cardiac death his grandfather  at 39years of age due to massive heart attack. His father passed away when he was 40years old due to heart attack as well but it was cocaine induced as per patient. Given the history of type 1 diabetes as well as a current smoker we will do an EKG for further evaluation cardiac status  - EKG 12 lead; Future    7.  Encounter for screening for malignant neoplasm of colon  - Юлия Olsen MD, Gastroenterology (E[facility]), [facility]              REVIEW OF SYSTEMS:  CONSTITUTIONAL: No weight loss, fever, weakness or fatigue. HEENT:No sore throat, runny nose, earache. No vision or hearing disturbances   CARDIOVASCULAR: No chest pain,No palpitations or edema. RESPIRATORY : No shortness of breath, cough. GASTROINTESTINAL: No nausea, vomiting, diarrhea or constipation. No abdominal pain. GENITOURINARY:No dysuria, urgency, frequency, hematuria. NEUROLOGICAL: No headache, dizziness or syncope. MUSCULOSKELETAL: No muscle, back pain, joint pain or stiffness. PSYCHIATRIC : No mood changes        Lab Results   Component Value Date    WBC 6.5 06/25/2016    HGB 14.1 06/25/2016    HCT 42.8 06/25/2016    MCV 94.8 06/25/2016     06/25/2016      Lab Results   Component Value Date    LABA1C 11.2 (H) 03/05/2012     No results found for: EAG  No results found for: TSHFT4, TSH  No results found for: CHOL  No results found for: TRIG  No results found for: HDL  No results found for: LDLCHOLESTEROL, LDLCALC  No results found for: LABVLDL, VLDL  No results found for: [facility]   Lab Results   Component Value Date     06/25/2016    K 4.3 06/25/2016     06/25/2016    CO2 26 06/25/2016    BUN 10 06/25/2016    CREATININE 0.6 (L) 06/25/2016    GLUCOSE 187 (H) 06/25/2016    CALCIUM 8.2 (L) 06/25/2016    PROT 7.5 06/23/2016    LABALBU 3.4 06/25/2016    BILITOT 0.5 06/23/2016    ALKPHOS 90 06/23/2016    AST 28 06/23/2016    ALT 27 06/23/2016    LABGLOM >60 06/25/2016    GFRAA >60 06/25/2016           Current Outpatient Medications   Medication Sig Dispense Refill    Continuous Blood Gluc  (DEXCOM G6 ) SCOTT Use as directed to monitor glucose levels.  Continuous Blood Gluc Sensor (DEXCOM G6 SENSOR) MISC Use as directed to monitor glucose levels. Change sensor every 10 days.       Continuous Blood Gluc Transmit (DEXCOM G6 TRANSMITTER) MISC Use as directed to monitor glucose levels. Replace transmitter every 90 days      cephALEXin (KEFLEX) 500 MG capsule Take 1 capsule by mouth 2 times daily for 7 days 14 capsule 0    ibuprofen (ADVIL;MOTRIN) 600 MG tablet Take 1 tablet by mouth every 8 hours as needed for Pain 30 tablet 0    HUMALOG 100 UNIT/ML injection vial       Multiple Vitamins-Minerals (THERAPEUTIC MULTIVITAMIN-MINERALS) tablet Take 1 tablet by mouth daily      Ascorbic Acid (VITAMIN C) 250 MG tablet Take 250 mg by mouth daily      atorvastatin (LIPITOR) 20 MG tablet Take 20 mg by mouth daily.  aspirin 81 MG chewable tablet Take 81 mg by mouth daily.  INSULIN INFUSION PUMP by Implant route. Humolog, adjustable- insulin pump left side of abdomen. No current facility-administered medications for this visit.        Allergies   Allergen Reactions    Hydrocodone-Acetaminophen Nausea And Vomiting     N/V ( Can use Percocet)           Past Medical History:   Diagnosis Date    H/O keloid of skin     right shoulder, removed surgically     Hyperlipidemia     Insulin pump status     Type 1 diabetes mellitus with diabetic neuropathy (HCC)     A1c 11.3 5/2016         Past Surgical History:   Procedure Laterality Date    SHOULDER SURGERY      SHOULDER SURGERY      arthoscopy of left shoulder         Family History   Problem Relation Age of Onset    Cancer Mother         breast    Heart Disease Maternal Grandfather         Social History     Socioeconomic History    Marital status:      Spouse name: None    Number of children: None    Years of education: None    Highest education level: None   Occupational History    Occupation:    Tobacco Use    Smoking status: Current Every Day Smoker     Packs/day: 1.00     Years: 20.00     Pack years: 20.00     Types: Cigarettes    Smokeless tobacco: Never Used   Vaping Use    Vaping Use: Former   Substance and Sexual Activity    Alcohol use: Yes     Comment: social, occas  Drug use: No    Sexual activity: Yes     Partners: Female   Other Topics Concern    None   Social History Narrative    ** Merged History Encounter **          Social Determinants of Health     Financial Resource Strain: Low Risk     Difficulty of Paying Living Expenses: Not hard at all   Food Insecurity: No Food Insecurity    Worried About Running Out of Food in the Last Year: Never true    [address] in the Last Year: Never true   Transportation Needs:     Lack of Transportation (Medical): Not on file    Lack of Transportation (Non-Medical): Not on file   Physical Activity:     Days of Exercise per Week: Not on file    Minutes of Exercise per Session: Not on file   Stress:     Feeling of Stress : Not on file   Social Connections:     Frequency of Communication with Friends and Family: Not on file    Frequency of Social Gatherings with Friends and Family: Not on file    Attends Sikh Services: Not on file    Active Member of [address] [place] [address] or Organizations: Not on file    Attends Club or Organization Meetings: Not on file    Marital Status: Not on file   Intimate Partner Violence:     Fear of Current or Ex-Partner: Not on file    Emotionally Abused: Not on file    Physically Abused: Not on file    Sexually Abused: Not on file   Housing Stability:     Unable to Pay for Housing in the Last Year: Not on file    Number of [place] in the Last Year: Not on file    Unstable Housing in the Last Year: Not on file          OBJECTIVE:      Vitals:    05/31/22 1353   BP: (!) 100/58   Pulse: 78   Temp: 97.4 °F (36.3 °C)   TempSrc: Temporal   SpO2: 97%   Weight: 161 lb 9.6 oz (73.3 kg)   Height: 6' (1.829 m)         Constitutional: Patient appears well-nourished, not in any distress. HENT:  Head: Normocephalic and atraumatic.    Eyes: Conjunctivae and EOM are normal  Right Ear: External ear normal.  Left Ear: External ear normal.   Nose: Nose normal.  Mouth/Throat: Oropharynx is clear and moist. Neck: Normal range of motion. Neck supple. Lymphatic: No cervical lymphadenopathy  Cardiovascular: Normal rate, regular rhythm, normal heart sounds and intact distal pulses. Pulmonary/Chest: Effort normal and breath sounds normal, no wheezes or rhonchi. Neurological:Patient is alert, oriented to person, place, and time. No obvious focal neurological deficits  Lamination of the right foot reveals swelling up to ankle, wound cellulitis on top of foot with scabbing and yellowish discharge. Bruising and ecchymosis up to ankle. Wound dressing with Polysporin, 4 x 4 gauze and Ace wrap applied today  Skin: Skin is warm and moist.  Psychiatric:Patient has a normal mood and affect, behavior is normal. Judgment and thought content normal.    ASSESSMENT AND PLAN    Colette Schwab was seen today for new patient and other. Diagnoses and all orders for this visit:    Wound infection    Injury of right foot, subsequent encounter    Type 1 diabetes mellitus with other specified complication ([facility] [address]  -     EKG 12 lead; Future    Mixed hyperlipidemia    Family history of sudden cardiac death    Current smoker    Encounter for screening for malignant neoplasm of colon  -     AFL - Eduardo Ricardo MD, Gastroenterology (EUS), [place]    Other orders  -     cephALEXin (KEFLEX) 500 MG capsule; Take 1 capsule by mouth 2 times daily for 7 days  -     Apply ace wrap           DISCHARGE MEDICATION LIST        Medication List          Accurate as of May 31, 2022  2:18 PM. If you have any questions, ask your nurse or doctor.             START taking these medications    cephALEXin 500 MG capsule  Commonly known as: KEFLEX  Take 1 capsule by mouth 2 times daily for 7 days  Started by: Shahla Bro MD        CONTINUE taking these medications    aspirin 81 MG chewable tablet     atorvastatin 20 MG tablet  Commonly known as: LIPITOR     Dexcom G6  Winifred     Dexcom G6 Sensor Misc     Dexcom G6 Transmitter Misc     HumaLOG 100 UNIT/ML Soln injection vial  Generic drug: insulin lispro     ibuprofen 600 MG tablet  Commonly known as: ADVIL;MOTRIN  Take 1 tablet by mouth every 8 hours as needed for Pain     INSULIN INFUSION PUMP     therapeutic multivitamin-minerals tablet     vitamin C 250 MG tablet           Where to Get Your Medications      These medications were sent to [facility] [id number] [facility] [place], [address]  [phone] Daniel Sharma [phone]  [address] [place] [address]    Phone: [phone]   · cephALEXin 500 MG capsule          Return in about 2 weeks (around 6/14/2022), or if symptoms worsen or fail to improve. Please refer to diagnosis, orders and patient instructions for further recommendations given. All patient's questions and concerns were addressed appropriately. All orders, handouts were reviewed in detail with the patient and patient voiced understanding verbally.

## 2022-06-03 ENCOUNTER — TELEPHONE (OUTPATIENT)
Dept: FAMILY MEDICINE CLINIC | Age: 47
End: 2022-06-03

## 2022-06-03 DIAGNOSIS — L08.9 WOUND INFECTION: Primary | ICD-10-CM

## 2022-06-03 DIAGNOSIS — T14.8XXA WOUND INFECTION: Primary | ICD-10-CM

## 2022-06-03 DIAGNOSIS — E10.69 MIXED DIABETIC HYPERLIPIDEMIA ASSOCIATED WITH TYPE 1 DIABETES MELLITUS (HCC): ICD-10-CM

## 2022-06-03 DIAGNOSIS — E78.2 MIXED DIABETIC HYPERLIPIDEMIA ASSOCIATED WITH TYPE 1 DIABETES MELLITUS (HCC): ICD-10-CM

## 2022-06-03 RX ORDER — SULFAMETHOXAZOLE AND TRIMETHOPRIM 800; 160 MG/1; MG/1
1 TABLET ORAL 2 TIMES DAILY
Qty: 14 TABLET | Refills: 0 | Status: ON HOLD | OUTPATIENT
Start: 2022-06-03 | End: 2022-06-07 | Stop reason: HOSPADM

## 2022-06-03 NOTE — TELEPHONE ENCOUNTER
Please advise patient to discontinue current antibiotic. Please call in Bactrim DS bid for 7 days to the pharmacy.  If symptoms continue to get worse during the weekend please advise to go to emergency room

## 2022-06-03 NOTE — TELEPHONE ENCOUNTER
I called and spoke with [facility] in [employer] and was informed that medication prescription was received and in the process of being completed. Called and spoke with pt and informed him that medication is in th process of being completed. No further questions or concerns at this time.

## 2022-06-03 NOTE — TELEPHONE ENCOUNTER
Pt called to say that after 2 days of antibiotics from Dr. Crystal Richardson, the redness of his right foot is increasing. Please call gurwinder.     LOV 05/31/2022

## 2022-06-03 NOTE — TELEPHONE ENCOUNTER
----- Message from [facility] sent at 6/3/2022  3:00 PM EDT -----  Subject: Message to Provider    QUESTIONS  Information for Provider? Pt called because he went to  new   prescription at [facility] and they do not have it yet. He would like to make   sure it is called in before the office closes. ---------------------------------------------------------------------------  --------------  Finis Lady INFO  What is the best way for the office to contact you? OK to leave message on   voicemail  Preferred Call Back Phone Number? [phone]  ---------------------------------------------------------------------------  --------------  SCRIPT ANSWERS  Relationship to Patient?  Self

## 2022-06-05 ENCOUNTER — HOSPITAL ENCOUNTER (INPATIENT)
Age: 47
LOS: 1 days | Discharge: HOME | DRG: 420 | End: 2022-06-07
Attending: STUDENT IN AN ORGANIZED HEALTH CARE EDUCATION/TRAINING PROGRAM | Admitting: INTERNAL MEDICINE
Payer: COMMERCIAL

## 2022-06-05 ENCOUNTER — APPOINTMENT (OUTPATIENT)
Dept: GENERAL RADIOLOGY | Age: 47
DRG: 420 | End: 2022-06-05
Payer: COMMERCIAL

## 2022-06-05 DIAGNOSIS — L03.115 CELLULITIS OF RIGHT LOWER EXTREMITY: Primary | ICD-10-CM

## 2022-06-05 LAB
ANION GAP SERPL CALCULATED.3IONS-SCNC: 9 MMOL/L (ref 3–16)
BASOPHILS ABSOLUTE: 0.1 K/UL (ref 0–0.2)
BASOPHILS RELATIVE PERCENT: 0.9 %
BUN BLDV-MCNC: 19 MG/DL (ref 7–20)
C-REACTIVE PROTEIN: 6.5 MG/L (ref 0–5.1)
CALCIUM SERPL-MCNC: 9.2 MG/DL (ref 8.3–10.6)
CHLORIDE BLD-SCNC: 103 MMOL/L (ref 99–110)
CO2: 25 MMOL/L (ref 21–32)
CREAT SERPL-MCNC: 1 MG/DL (ref 0.9–1.3)
EOSINOPHILS ABSOLUTE: 0.2 K/UL (ref 0–0.6)
EOSINOPHILS RELATIVE PERCENT: 3.1 %
GFR AFRICAN AMERICAN: >60
GFR NON-AFRICAN AMERICAN: >60
GLUCOSE BLD-MCNC: 157 MG/DL (ref 70–99)
HCT VFR BLD CALC: 41.4 % (ref 40.5–52.5)
HEMOGLOBIN: 14.1 G/DL (ref 13.5–17.5)
LYMPHOCYTES ABSOLUTE: 1.7 K/UL (ref 1–5.1)
LYMPHOCYTES RELATIVE PERCENT: 25.4 %
MCH RBC QN AUTO: 32.1 PG (ref 26–34)
MCHC RBC AUTO-ENTMCNC: 34.1 G/DL (ref 31–36)
MCV RBC AUTO: 94.2 FL (ref 80–100)
MONOCYTES ABSOLUTE: 0.6 K/UL (ref 0–1.3)
MONOCYTES RELATIVE PERCENT: 9.2 %
NEUTROPHILS ABSOLUTE: 4.1 K/UL (ref 1.7–7.7)
NEUTROPHILS RELATIVE PERCENT: 61.4 %
PDW BLD-RTO: 13.1 % (ref 12.4–15.4)
PLATELET # BLD: 205 K/UL (ref 135–450)
PMV BLD AUTO: 8.3 FL (ref 5–10.5)
POTASSIUM REFLEX MAGNESIUM: 4.5 MMOL/L (ref 3.5–5.1)
RBC # BLD: 4.4 M/UL (ref 4.2–5.9)
SEDIMENTATION RATE, ERYTHROCYTE: 15 MM/HR (ref 0–15)
SODIUM BLD-SCNC: 137 MMOL/L (ref 136–145)
WBC # BLD: 6.7 K/UL (ref 4–11)

## 2022-06-05 PROCEDURE — 96365 THER/PROPH/DIAG IV INF INIT: CPT

## 2022-06-05 PROCEDURE — 36415 COLL VENOUS BLD VENIPUNCTURE: CPT

## 2022-06-05 PROCEDURE — 85025 COMPLETE CBC W/AUTO DIFF WBC: CPT

## 2022-06-05 PROCEDURE — 86140 C-REACTIVE PROTEIN: CPT

## 2022-06-05 PROCEDURE — 96367 TX/PROPH/DG ADDL SEQ IV INF: CPT

## 2022-06-05 PROCEDURE — 73630 X-RAY EXAM OF FOOT: CPT

## 2022-06-05 PROCEDURE — 80048 BASIC METABOLIC PNL TOTAL CA: CPT

## 2022-06-05 PROCEDURE — 99285 EMERGENCY DEPT VISIT HI MDM: CPT

## 2022-06-05 PROCEDURE — 85652 RBC SED RATE AUTOMATED: CPT

## 2022-06-05 ASSESSMENT — ENCOUNTER SYMPTOMS
GASTROINTESTINAL NEGATIVE: 1
RESPIRATORY NEGATIVE: 1
COLOR CHANGE: 0

## 2022-06-06 ENCOUNTER — APPOINTMENT (OUTPATIENT)
Dept: CT IMAGING | Age: 47
DRG: 420 | End: 2022-06-06
Payer: COMMERCIAL

## 2022-06-06 PROBLEM — L08.9 RIGHT FOOT INFECTION: Status: ACTIVE | Noted: 2022-06-06

## 2022-06-06 PROBLEM — L03.90 CELLULITIS: Status: ACTIVE | Noted: 2022-06-06

## 2022-06-06 LAB
ANION GAP SERPL CALCULATED.3IONS-SCNC: 8 MMOL/L (ref 3–16)
BUN BLDV-MCNC: 15 MG/DL (ref 7–20)
CALCIUM SERPL-MCNC: 9 MG/DL (ref 8.3–10.6)
CHLORIDE BLD-SCNC: 103 MMOL/L (ref 99–110)
CO2: 23 MMOL/L (ref 21–32)
CREAT SERPL-MCNC: 0.9 MG/DL (ref 0.9–1.3)
ESTIMATED AVERAGE GLUCOSE: 220.2 MG/DL
GFR AFRICAN AMERICAN: >60
GFR NON-AFRICAN AMERICAN: >60
GLUCOSE BLD-MCNC: 103 MG/DL (ref 70–99)
GLUCOSE BLD-MCNC: 112 MG/DL (ref 70–99)
GLUCOSE BLD-MCNC: 202 MG/DL (ref 70–99)
GLUCOSE BLD-MCNC: 79 MG/DL (ref 70–99)
HBA1C MFR BLD: 9.3 %
MAGNESIUM: 1.9 MG/DL (ref 1.8–2.4)
PERFORMED ON: ABNORMAL
PERFORMED ON: ABNORMAL
PERFORMED ON: NORMAL
POTASSIUM REFLEX MAGNESIUM: 4.2 MMOL/L (ref 3.5–5.1)
PREALBUMIN: 17.7 MG/DL (ref 20–40)
SODIUM BLD-SCNC: 134 MMOL/L (ref 136–145)

## 2022-06-06 PROCEDURE — 2500000003 HC RX 250 WO HCPCS: Performed by: STUDENT IN AN ORGANIZED HEALTH CARE EDUCATION/TRAINING PROGRAM

## 2022-06-06 PROCEDURE — 99254 IP/OBS CNSLTJ NEW/EST MOD 60: CPT | Performed by: INTERNAL MEDICINE

## 2022-06-06 PROCEDURE — 36415 COLL VENOUS BLD VENIPUNCTURE: CPT

## 2022-06-06 PROCEDURE — 6360000002 HC RX W HCPCS

## 2022-06-06 PROCEDURE — 1200000000 HC SEMI PRIVATE

## 2022-06-06 PROCEDURE — 80048 BASIC METABOLIC PNL TOTAL CA: CPT

## 2022-06-06 PROCEDURE — 6360000002 HC RX W HCPCS: Performed by: STUDENT IN AN ORGANIZED HEALTH CARE EDUCATION/TRAINING PROGRAM

## 2022-06-06 PROCEDURE — 73700 CT LOWER EXTREMITY W/O DYE: CPT

## 2022-06-06 PROCEDURE — 6370000000 HC RX 637 (ALT 250 FOR IP): Performed by: INTERNAL MEDICINE

## 2022-06-06 PROCEDURE — 83735 ASSAY OF MAGNESIUM: CPT

## 2022-06-06 PROCEDURE — 2580000003 HC RX 258

## 2022-06-06 PROCEDURE — 87205 SMEAR GRAM STAIN: CPT

## 2022-06-06 PROCEDURE — 87070 CULTURE OTHR SPECIMN AEROBIC: CPT

## 2022-06-06 PROCEDURE — 84134 ASSAY OF PREALBUMIN: CPT

## 2022-06-06 PROCEDURE — 83036 HEMOGLOBIN GLYCOSYLATED A1C: CPT

## 2022-06-06 PROCEDURE — 2580000003 HC RX 258: Performed by: STUDENT IN AN ORGANIZED HEALTH CARE EDUCATION/TRAINING PROGRAM

## 2022-06-06 PROCEDURE — 6370000000 HC RX 637 (ALT 250 FOR IP)

## 2022-06-06 RX ORDER — LINEZOLID 600 MG/1
600 TABLET, FILM COATED ORAL EVERY 12 HOURS SCHEDULED
Status: DISCONTINUED | OUTPATIENT
Start: 2022-06-06 | End: 2022-06-07 | Stop reason: HOSPADM

## 2022-06-06 RX ORDER — ONDANSETRON 4 MG/1
4 TABLET, ORALLY DISINTEGRATING ORAL EVERY 8 HOURS PRN
Status: DISCONTINUED | OUTPATIENT
Start: 2022-06-06 | End: 2022-06-07 | Stop reason: HOSPADM

## 2022-06-06 RX ORDER — ACETAMINOPHEN 325 MG/1
650 TABLET ORAL EVERY 6 HOURS PRN
Status: DISCONTINUED | OUTPATIENT
Start: 2022-06-06 | End: 2022-06-07 | Stop reason: HOSPADM

## 2022-06-06 RX ORDER — ENOXAPARIN SODIUM 100 MG/ML
40 INJECTION SUBCUTANEOUS DAILY
Status: DISCONTINUED | OUTPATIENT
Start: 2022-06-06 | End: 2022-06-07 | Stop reason: HOSPADM

## 2022-06-06 RX ORDER — POLYETHYLENE GLYCOL 3350 17 G/17G
17 POWDER, FOR SOLUTION ORAL DAILY PRN
Status: DISCONTINUED | OUTPATIENT
Start: 2022-06-06 | End: 2022-06-07 | Stop reason: HOSPADM

## 2022-06-06 RX ORDER — ACETAMINOPHEN 650 MG/1
650 SUPPOSITORY RECTAL EVERY 6 HOURS PRN
Status: DISCONTINUED | OUTPATIENT
Start: 2022-06-06 | End: 2022-06-07 | Stop reason: HOSPADM

## 2022-06-06 RX ORDER — ASPIRIN 81 MG/1
81 TABLET, CHEWABLE ORAL DAILY
Status: DISCONTINUED | OUTPATIENT
Start: 2022-06-06 | End: 2022-06-07 | Stop reason: HOSPADM

## 2022-06-06 RX ORDER — ATORVASTATIN CALCIUM 20 MG/1
20 TABLET, FILM COATED ORAL DAILY
Status: DISCONTINUED | OUTPATIENT
Start: 2022-06-06 | End: 2022-06-07 | Stop reason: HOSPADM

## 2022-06-06 RX ORDER — ONDANSETRON 2 MG/ML
4 INJECTION INTRAMUSCULAR; INTRAVENOUS EVERY 6 HOURS PRN
Status: DISCONTINUED | OUTPATIENT
Start: 2022-06-06 | End: 2022-06-07 | Stop reason: HOSPADM

## 2022-06-06 RX ORDER — SODIUM CHLORIDE 0.9 % (FLUSH) 0.9 %
5-40 SYRINGE (ML) INJECTION PRN
Status: DISCONTINUED | OUTPATIENT
Start: 2022-06-06 | End: 2022-06-07 | Stop reason: HOSPADM

## 2022-06-06 RX ORDER — DEXTROSE MONOHYDRATE 50 MG/ML
100 INJECTION, SOLUTION INTRAVENOUS PRN
Status: DISCONTINUED | OUTPATIENT
Start: 2022-06-06 | End: 2022-06-07 | Stop reason: HOSPADM

## 2022-06-06 RX ORDER — SODIUM CHLORIDE 0.9 % (FLUSH) 0.9 %
5-40 SYRINGE (ML) INJECTION EVERY 12 HOURS SCHEDULED
Status: DISCONTINUED | OUTPATIENT
Start: 2022-06-06 | End: 2022-06-07 | Stop reason: HOSPADM

## 2022-06-06 RX ORDER — SODIUM CHLORIDE 9 MG/ML
INJECTION, SOLUTION INTRAVENOUS PRN
Status: DISCONTINUED | OUTPATIENT
Start: 2022-06-06 | End: 2022-06-07 | Stop reason: HOSPADM

## 2022-06-06 RX ADMIN — SODIUM CHLORIDE, PRESERVATIVE FREE 10 ML: 5 INJECTION INTRAVENOUS at 21:44

## 2022-06-06 RX ADMIN — ATORVASTATIN CALCIUM 20 MG: 20 TABLET, FILM COATED ORAL at 07:57

## 2022-06-06 RX ADMIN — DOXYCYCLINE 100 MG: 100 INJECTION, POWDER, LYOPHILIZED, FOR SOLUTION INTRAVENOUS at 02:54

## 2022-06-06 RX ADMIN — SODIUM CHLORIDE, PRESERVATIVE FREE 10 ML: 5 INJECTION INTRAVENOUS at 08:07

## 2022-06-06 RX ADMIN — CEFTRIAXONE 1000 MG: 1 INJECTION, POWDER, FOR SOLUTION INTRAMUSCULAR; INTRAVENOUS at 06:55

## 2022-06-06 RX ADMIN — LINEZOLID 600 MG: 600 TABLET, FILM COATED ORAL at 21:42

## 2022-06-06 RX ADMIN — CEFAZOLIN 2000 MG: 2 INJECTION, POWDER, FOR SOLUTION INTRAMUSCULAR; INTRAVENOUS at 02:16

## 2022-06-06 RX ADMIN — ENOXAPARIN SODIUM 40 MG: 100 INJECTION SUBCUTANEOUS at 07:56

## 2022-06-06 RX ADMIN — ASPIRIN 81 MG: 81 TABLET, CHEWABLE ORAL at 07:57

## 2022-06-06 RX ADMIN — LINEZOLID 600 MG: 600 TABLET, FILM COATED ORAL at 11:42

## 2022-06-06 RX ADMIN — HYDROMORPHONE HYDROCHLORIDE 0.5 MG: 1 INJECTION, SOLUTION INTRAMUSCULAR; INTRAVENOUS; SUBCUTANEOUS at 07:57

## 2022-06-06 ASSESSMENT — ENCOUNTER SYMPTOMS
GASTROINTESTINAL NEGATIVE: 1
COUGH: 0
SHORTNESS OF BREATH: 0

## 2022-06-06 ASSESSMENT — PAIN SCALES - GENERAL: PAINLEVEL_OUTOF10: 2

## 2022-06-06 NOTE — PROGRESS NOTES
Patient arrived to room 5312 via wheelchair. Patient able to ambulate independently. A&Ox4. VSS. Patient has insulin pump and form was signed and placed in chart. Wound on top of R foot open to air. Placed on tele per orders. Bed is in lowest setting and in locked position. Call light is within reach. All needs met at this time.

## 2022-06-06 NOTE — PROGRESS NOTES
Patient a/ox4 throughout shift. VSS. One time pain medication given per podiatry for wound culture to be obtained. Patient ambulating independently without complications but aware of his heel weight bearing status. Bed locked and in lowest position. CLWR. No other needs at this time.

## 2022-06-06 NOTE — CARE COORDINATION
Case Management Assessment           Initial Evaluation                Date / Time of Evaluation: 6/6/2022 5:27 PM                 Assessment Completed by: ISIDRO Crenshaw    Patient Name: Edith Smith     YOB: 1975  Diagnosis: Cellulitis [L03.90]  Cellulitis of right lower extremity [L03.115]  Right foot infection [L08.9]     Date / Time: 6/5/2022  8:59 PM    Patient Admission Status: Inpatient    If patient is discharged prior to next notation, then this note serves as note for discharge by case management. Current PCP: Christopher Aguirre MD  Clinic Patient: No    Chart Reviewed: Yes  Patient/ Family Interviewed: No    Initial assessment completed at bedside with: care team    Hospitalization in the last 30 days: No    Emergency Contacts:  Extended Emergency Contact Information  Primary Emergency Contact: [address] Phone: [phone]  Relation: Parent    Advance Directives:   Code Status: Full [address]: No  Agent:   Contact Number:     Copy present: No     In paper Chart: No    Scanned into EMR No    Financial  Payor: Lucinda Sawant / Plan: Carla Callaway / Product Type: *No Product type* /     Pre-cert required for SNF: Yes    Pharmacy    [id number] [place] [facility] [phone]  [facility] [address]  [address] [place] [address]  Phone: [phone] Fax: [phone]    [facility] [phone] [place] [address] [phone] [facility] [phone]  [address] [place] [address] [address] [place] [address]  Phone: [phone] Fax: [phone]      Potential assistance Purchasing Medications: Potential Assistance Purchasing Medications: No  Does Patient want to participate in local refill/ meds to beds program?:      Meds To Beds General Rules:  1. Can ONLY be done Monday- Friday between 8:30am-5pm  2. Prescription(s) must be in pharmacy by 3pm to be filled same day  3. Copy of patient's insurance/ prescription drug card and patient face sheet must be sent along with the prescription(s)  4. Cost of Rx cannot be added to hospital bill. If financial assistance is needed, please contact unit  or ;  or  CANNOT provide pharmacy voucher for patients co-pays  5. Patients can then  the prescription on their way out of the hospital at discharge, or pharmacy can deliver to the bedside if staff is available. (payment due at time of pick-up or delivery - cash, check, or card accepted)     Able to afford home medications/ co-pay costs: Yes    ADLS  Support Systems: Children    PT AM-PAC:   /24  OT AM-PAC:   /24    HOUSING  Home Environment: patient lives independently in ranch home with one step to enter the house. Steps: one step to enter    Plans to RETURN to current housing: Yes  Barriers to RETURNING to current housing: podiatry & ID consults, pending CT. Home Care Information  Currently ACTIVE with Home Health Care: No  Home Care Agency: Not Applicable    Currently ACTIVE with [place] on Aging: No  Passport/ Waiver: No  Passport/ Waiver Services: Not Applicable    :  Phone:       Durable Medical Equipment  DME Provider: n/a  Equipment: n/a    Home Oxygen and Respiratory Equipment  Has HOME OXYGEN prior to admission: No  Oxygen Company: Not Applicable  Other Respiratory Equipment: n/a    Informed of need to bring portable home O2 tank on day of DISCHARGE for nursing to connect prior to leaving: No  Verbalized agreement/Understanding: No  Person to bring portable tank at discharge: n/a    Dialysis  Active with HD/PD prior to admission: No  Nephrologist: Yessi Main:  Not Applicable    DISCHARGE PLAN:  Disposition: Home- No Services Needed    Transportation PLAN for discharge: family     Factors facilitating achievement of predicted outcomes:  Motivated    Barriers to discharge: podiatry & ID consults, pending CT.    Additional Case Management Notes: Review of chart and patient reports to nursing team indicate the patient lives independently in a ranch home with one step to enter. Patient has no current services. There are no expected CM needs at discharge. Patient will return home with no needed services. The Plan for Transition of Care is related to the following treatment goals of Cellulitis [L03.90]  Cellulitis of right lower extremity [L03.115]  Right foot infection [L08.9]    The Patient and/or patient representative Jake Ayala and his family were provided with a choice of provider and agrees with the discharge plan Not Indicated    Freedom of choice list was provided with basic dialogue that supports the patient's individualized plan of care/goals and shares the quality data associated with the providers.  Not Indicated    Care Transition patient: No    ISIDRO Pineda  [facility]  Case Management Department  Ph: [phone]   Fax: [phone]

## 2022-06-06 NOTE — ED PROVIDER NOTES
[facility] RESIDENT NOTE       Date of evaluation: 6/5/2022    Chief Complaint     Foot Injury (dropped a palette keyona on right foot on wednesday with open wound. was seen at [facility] and prescribed antibiotics. redness and swelling noted on tuesday and worsening since then ) and Wound Infection (diabetic, open wound worsening )      History of Present Illness     Vivi Baez is a 40 y.o. male with PMH of hyperlipidemia, type 1 diabetes complicated by diabetic neuropathy who presents foot injury. Patient presents with concern for right foot infection in the context of recent injury on 5/25. At that time, patient sustained blunt trauma to the dorsal aspect of his right foot after a pallet keyona accidentally fell on it. An x-ray performed at [facility] ED at that time did not demonstrate any acute osseous abnormalities. He was subsequently seen on Tuesday, 5/31 with concern for possible infection, and initiated on Keflex. He continued to take these as prescribed, but erythema around the wound continued to worsen. He was subsequently transition to Bactrim on Friday, 6/3; however, the wound has continued to drain and progress with respect to erythema and swelling. Denies any systemic signs of illness to include fever, chills, lightheadedness, chest pain, difficulty breathing, nausea, emesis, abdominal pain. 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 Review of Systems     Review of Systems   Constitutional: Negative. HENT: Negative. Respiratory: Negative. Cardiovascular: Negative. Gastrointestinal: Negative. Genitourinary: Negative. Musculoskeletal: Negative. Skin: Positive for wound. Negative for color change, pallor and rash. Neurological: Negative.         Past Medical, Surgical, Family, and Social History     He has a past medical history of H/O keloid of skin, Hyperlipidemia, Insulin pump status, and Type 1 diabetes mellitus with diabetic neuropathy ([facility] [address] He has a past surgical history that includes shoulder surgery and shoulder surgery. His family history includes Cancer in his mother; Heart Disease in his maternal grandfather. He reports that he has been smoking cigarettes. He has a 20.00 pack-year smoking history. He has never used smokeless tobacco. He reports current alcohol use. He reports that he does not use drugs. Medications     Previous Medications    ASCORBIC ACID (VITAMIN C) 250 MG TABLET    Take 250 mg by mouth daily    ASPIRIN 81 MG CHEWABLE TABLET    Take 81 mg by mouth daily. ATORVASTATIN (LIPITOR) 20 MG TABLET    Take 20 mg by mouth daily. CEPHALEXIN (KEFLEX) 500 MG CAPSULE    Take 1 capsule by mouth 2 times daily for 7 days    CONTINUOUS BLOOD GLUC  (DEXCOM G6 ) SCOTT    Use as directed to monitor glucose levels. CONTINUOUS BLOOD GLUC SENSOR (DEXCOM G6 SENSOR) MISC    Use as directed to monitor glucose levels. Change sensor every 10 days. CONTINUOUS BLOOD GLUC TRANSMIT (DEXCOM G6 TRANSMITTER) MISC    Use as directed to monitor glucose levels. Replace transmitter every 90 days    HUMALOG 100 UNIT/ML INJECTION VIAL        IBUPROFEN (ADVIL;MOTRIN) 600 MG TABLET    Take 1 tablet by mouth every 8 hours as needed for Pain    INSULIN INFUSION PUMP    by Implant route. Humolog, adjustable- insulin pump left side of abdomen. MULTIPLE VITAMINS-MINERALS (THERAPEUTIC MULTIVITAMIN-MINERALS) TABLET    Take 1 tablet by mouth daily    SULFAMETHOXAZOLE-TRIMETHOPRIM (BACTRIM DS;SEPTRA DS) 800-160 MG PER TABLET    Take 1 tablet by mouth 2 times daily for 7 days       Allergies     He is allergic to hydrocodone-acetaminophen. Physical Exam     INITIAL VITALS: BP: 117/73, Temp: 98.1 °F (36.7 °C), Heart Rate: 84, Resp: 18, SpO2: 98 %   Physical Exam  Constitutional:       General: He is not in acute distress. Appearance: Normal appearance.  He is normal weight. He is not ill-appearing or toxic-appearing. HENT:      Head: Normocephalic and atraumatic. Nose: Nose normal.      Mouth/Throat:      Mouth: Mucous membranes are moist.      Pharynx: Oropharynx is clear. Eyes:      Extraocular Movements: Extraocular movements intact. Conjunctiva/sclera: Conjunctivae normal.      Pupils: Pupils are equal, round, and reactive to light. Cardiovascular:      Rate and Rhythm: Regular rhythm. Pulses: Normal pulses. Heart sounds: Normal heart sounds. No murmur heard. No friction rub. No gallop. Pulmonary:      Effort: Pulmonary effort is normal.      Breath sounds: Normal breath sounds. Musculoskeletal:      Cervical back: Normal range of motion and neck supple. Skin:     General: Skin is warm and dry. Capillary Refill: Capillary refill takes less than 2 seconds. Comments: 5-cm area of erythema, swelling, fluctuance overlying dorsal aspect of foot with central lesion and potential purulence   Neurological:      General: No focal deficit present. Mental Status: He is alert and oriented to person, place, and time. Mental status is at baseline. Psychiatric:         Mood and Affect: Mood normal.         Behavior: Behavior normal.         Thought Content: Thought content normal.         Judgment: Judgment normal.         DiagnosticResults     EKG     RADIOLOGY:  XR FOOT RIGHT (MIN 3 VIEWS)   Final Result      No radiographic evidence of acute osteomyelitis.            LABS:   Results for orders placed or performed during the hospital encounter of 06/05/22   CBC with Auto Differential   Result Value Ref Range    WBC 6.7 4.0 - 11.0 K/uL    RBC 4.40 4.20 - 5.90 M/uL    Hemoglobin 14.1 13.5 - 17.5 g/dL    Hematocrit 41.4 40.5 - 52.5 %    MCV 94.2 80.0 - 100.0 fL    MCH 32.1 26.0 - 34.0 pg    MCHC 34.1 31.0 - 36.0 g/dL    RDW 13.1 12.4 - 15.4 %    Platelets 527 514 - 154 K/uL    MPV 8.3 5.0 - 10.5 fL    Neutrophils % 61.4 % Lymphocytes % 25.4 %    Monocytes % 9.2 %    Eosinophils % 3.1 %    Basophils % 0.9 %    Neutrophils Absolute 4.1 1.7 - 7.7 K/uL    Lymphocytes Absolute 1.7 1.0 - 5.1 K/uL    Monocytes Absolute 0.6 0.0 - 1.3 K/uL    Eosinophils Absolute 0.2 0.0 - 0.6 K/uL    Basophils Absolute 0.1 0.0 - 0.2 K/uL   Basic Metabolic Panel w/ Reflex to MG   Result Value Ref Range    Sodium 137 136 - 145 mmol/L    Potassium reflex Magnesium 4.5 3.5 - 5.1 mmol/L    Chloride 103 99 - 110 mmol/L    CO2 25 21 - 32 mmol/L    Anion Gap 9 3 - 16    Glucose 157 (H) 70 - 99 mg/dL    BUN 19 7 - 20 mg/dL    CREATININE 1.0 0.9 - 1.3 mg/dL    GFR Non-African American >60 >60    GFR African American >60 >60    Calcium 9.2 8.3 - 10.6 mg/dL   Sedimentation Rate   Result Value Ref Range    Sed Rate 15 0 - 15 mm/Hr   C-Reactive Protein   Result Value Ref Range    CRP 6.5 (H) 0.0 - 5.1 mg/L       ED BEDSIDE ULTRASOUND:  RECENT VITALS:  BP: 120/61, Temp: 98.1 °F (36.7 °C), Heart Rate: 67,Resp: 16, SpO2: 98 %     Procedures     ED Course     Nursing Notes, Past Medical Hx, Past Surgical Hx, Social Hx, Allergies, and Family Hx were reviewed. The patient was given the followingmedications:  Orders Placed This Encounter   Medications    ceFAZolin (ANCEF) 2,000 mg in sodium chloride 0.9 % 50 mL IVPB (mini-bag)     Order Specific Question:   Antimicrobial Indications     Answer:   Skin and Soft Tissue Infection    doxycycline (VIBRAMYCIN) 100 mg in dextrose 5 % 100 mL IVPB     Order Specific Question:   Antimicrobial Indications     Answer:   Skin and Soft Tissue Infection       CONSULTS:  IP CONSULT TO HOSPITALIST    MEDICAL DECISION MAKING / ASSESSMENT / Manuela Gallagher is a 40 y.o. male with PMH of hyperlipidemia, type 1 diabetes complicated by diabetic neuropathy who presents foot injury. Patient afebrile, hemodynamically stable on presentation.   Patient presents with most 1 week of progressive right dorsal foot swelling, pain, and

## 2022-06-06 NOTE — CONSULTS
Department of Podiatry Consult Note  Resident       Reason for Consult:  Lower extremity right foot trauma and cellulitis  Requesting Physician:  Dr. Blain Sacks, MD    CHIEF COMPLAINT:  Lower extremity right foot trauma and cellulitis    HISTORY OF PRESENT ILLNESS:     The patient is a 40 y.o. male with significant past medical history as listed below who is consulted to podiatry for right lower extremity foot trauma with cellulitis. Patient presented to the hospital secondary to worsening redness and wound drainage to the right foot, patient was then admit for cellulitis after failing outpatient antibiotics. Patient's states on 5/25 he was working at home when a pellet keyona fell on the top his right foot. He presented to [place] ED where Xrays were taken and came back negative and was given naproxen for his pain. He presented to his PCP on the 5/31 for worsening redness and pain to his right lower extremity and was given keflex. He states the Keflex didn't work and was put on bactrim on 6/3. Patient states that it is still very tender and is dull and achy in nature. Patient does state that the redness has gotten better after he was put on IV antibiotics in the hospital. Patient denies any other pedal complaints. Patient denies any nausea, vomiting, fever, chill, or shortness of breath. Past Medical History:        Diagnosis Date    H/O keloid of skin     right shoulder, removed surgically     Hyperlipidemia     Insulin pump status     Type 1 diabetes mellitus with diabetic neuropathy (HCC)     A1c 11.3 5/2016       Past Surgical History:        Procedure Laterality Date    SHOULDER SURGERY      SHOULDER SURGERY      arthoscopy of left shoulder       Allergies:   Hydrocodone-acetaminophen    Medications:   Home Meds  No current facility-administered medications on file prior to encounter.      Current Outpatient Medications on File Prior to Encounter   Medication Sig Dispense Refill    sulfamethoxazole-trimethoprim (BACTRIM DS;SEPTRA DS) 800-160 MG per tablet Take 1 tablet by mouth 2 times daily for 7 days 14 tablet 0    Continuous Blood Gluc  (DEXCOM G6 ) [facility] Use as directed to monitor glucose levels.  Continuous Blood Gluc Sensor (DEXCOM G6 SENSOR) MISC Use as directed to monitor glucose levels. Change sensor every 10 days.  Continuous Blood Gluc Transmit (DEXCOM G6 TRANSMITTER) MISC Use as directed to monitor glucose levels. Replace transmitter every 90 days      cephALEXin (KEFLEX) 500 MG capsule Take 1 capsule by mouth 2 times daily for 7 days (Patient not taking: Reported on 6/6/2022) 14 capsule 0    ibuprofen (ADVIL;MOTRIN) 600 MG tablet Take 1 tablet by mouth every 8 hours as needed for Pain 30 tablet 0    HUMALOG 100 UNIT/ML injection vial       Multiple Vitamins-Minerals (THERAPEUTIC MULTIVITAMIN-MINERALS) tablet Take 1 tablet by mouth daily      Ascorbic Acid (VITAMIN C) 250 MG tablet Take 250 mg by mouth daily      atorvastatin (LIPITOR) 20 MG tablet Take 20 mg by mouth daily.  aspirin 81 MG chewable tablet Take 81 mg by mouth daily.  INSULIN INFUSION PUMP by Implant route. Humolog, adjustable- insulin pump left side of abdomen.          Current Meds  sodium chloride flush 0.9 % injection 5-40 mL, 2 times per day  sodium chloride flush 0.9 % injection 5-40 mL, PRN  0.9 % sodium chloride infusion, PRN  enoxaparin (LOVENOX) injection 40 mg, Daily  ondansetron (ZOFRAN-ODT) disintegrating tablet 4 mg, Q8H PRN   Or  ondansetron (ZOFRAN) injection 4 mg, Q6H PRN  polyethylene glycol (GLYCOLAX) packet 17 g, Daily PRN  acetaminophen (TYLENOL) tablet 650 mg, Q6H PRN   Or  acetaminophen (TYLENOL) suppository 650 mg, Q6H PRN  Insulin Pump - Bolus Dose (Patient Supplied), 4x Daily AC & HS  Insulin Pump - Basal Dose (Patient Supplied), Daily  glucose chewable tablet 16 g, PRN  dextrose bolus 10% 125 mL, PRN   Or  dextrose bolus 10% 250 mL, PRN  glucagon (rDNA) injection 1 mg, PRN  dextrose 5 % solution, PRN  aspirin chewable tablet 81 mg, Daily  atorvastatin (LIPITOR) tablet 20 mg, Daily  linezolid (ZYVOX) tablet 600 mg, 2 times per day        Family History:   Family History   Problem Relation Age of Onset    Cancer Mother         breast    Heart Disease Maternal Grandfather        Social History:   TOBACCO:   reports that he has been smoking cigarettes. He has a 20.00 pack-year smoking history. He has never used smokeless tobacco.  ETOH:   reports current alcohol use. DRUGS:   reports no history of drug use. REVIEW OF SYSTEMS:    A 10 point review of systems was conducted, significant findings as noted in HPI. All other systems negative. PHYSICAL EXAM:      Vitals:    /64   Pulse 61   Temp 97.7 °F (36.5 °C) (Oral)   Resp 16   Ht 6' (1.829 m)   Wt 164 lb 0.4 oz (74.4 kg)   SpO2 97%   BMI 22.25 kg/m²     LABS:   Recent Labs     06/05/22  2257   WBC 6.7   HGB 14.1   HCT 41.4        Recent Labs     06/06/22  0527   *   K 4.2      CO2 23   BUN 15   CREATININE 0.9     No results for input(s): PROT, INR, APTT in the last 72 hours. GENERAL: Patient is alert and oriented x3. No signs of acute distress noted. LOWER EXTREMITY EXAMINATION:  VASCULAR: DP and PT pulses are palpable 2/4 b/l. Upon hand held doppler examination DP and PT pulses were noted to be biphasic. CFT is brisk to the digits of the foot b/l. Skin temperature is warm to cool from proximal to distal with focal calor noted. Non pitting edema noted to the dorsal aspect of the right lower extremity. No pain with calf compression b/l. NEUROLOGIC: Gross and epicritic sensation is intact b/l. Protective sensation is intact at all pedal sites b/l. DERMATOLOGIC: Diffuse dermatologic changes noted to b/l LE with erythema present around the midfoot of the right lower extremity.     Patient provided verbal consent for photos to be obtained today, 06/06/22        Superficial abrasions noted to the dorsal aspect of the midfoot to the right lower extremity with surrounding erythema. Wound bases are fibrotic and granular in nature. The wounds do not probe to bone. No drainage noted from both wounds. No tunneling, tracking, crepitus, fluctuance, or malodor noted. Ecchymosis noted to the dorsal aspect of digits 1 and 2. Left  LE soft tissue envelope is closed without ulceration or acute signs of infection. MUSCULOSKELETAL: Muscle strength is 5/5 for all pedal groups tested. Diffuse pain with palpation of dorsal midfoot, right lower extremity. Ankle joint ROM is decreased in dorsiflexion with the knee extended. No obvious biomechanical abnormalities. IMAGING:  XR Right Foot 6/5/2022  Narrative   EXAM: XR FOOT RIGHT (MIN 3 VIEWS)       INDICATION:  Pain, infection       COMPARISON: None       FINDINGS:       No acute fracture. No evidence of cortical erosions or bone destruction. Mild soft tissue swelling on the dorsal aspect of the foot.           Impression       No radiographic evidence of acute osteomyelitis.        ASSESSMENT/PLAN:   -Superficial abrasions with superimposed cellulitis, right lower extremity  -Possible second metatarsal and midfoot fractures, right lower extremity  -Type 1 diabetes mellitus    -Patient seen and examined at bedside this AM  -VSS, No leukocytosis noted (WBC 6.7)  -ESR 15 and CRP 6.5  -HbA1c pending  -prealbumin ordered  -XR images reviewed, impression noted above  -CT of the right foot ordered to rule out any fractures to the right lower extremity  -Wound culture obtained will follow up results  -Continue IV antibiotics per ID recommendations  -ID following; recommend linezolid  -Right lower extremity dressed with Betadine soaked gauze, DSD, and Ace  -post-op shoe ordered to patient room  -Partial heel weightbearing to the right lower extremity in postop shoe  -Weightbearing as tolerated to left lower extremity in normal shoe gear    DISPO: Superficial abrasions with superimposed cellulitis, right lower extremity. Possible midfoot and second metatarsal fractures, right lower extremity. Labs and CT ordered we will follow up results. We will continue to follow patient while in house. Patient was examined and evaluated with MARY LOU PowellPCHARLIE. Alexander Jara DPM   Podiatric Resident PGY1  Pager [phone] or [employer]  6/6/2022, 10:49 AM      Patient was seen and evaluated at bedside. Agree with residents assessment and treatment plan.   Aspen Gilmore DPM

## 2022-06-06 NOTE — PROGRESS NOTES
4 Eyes Admission Assessment     I agree as the admission nurse that 2 RN's have performed a thorough Head to Toe Skin Assessment on the patient. ALL assessment sites listed below have been assessed on admission. Areas assessed by both nurses:   [x]   Head, Face, and Ears   [x]   Shoulders, Back, and Chest  [x]   Arms, Elbows, and Hands   [x]   Coccyx, Sacrum, and Ischium  [x]   Legs, Feet, and Heels        Does the Patient have Skin Breakdown?   Yes a wound was noted on the Admission Assessment and an LDA was Initiated documentation include the Susan-wound, Wound Assessment, Measurements, Dressing Treatment, Drainage, and Color\",        Wound on top of right foot    Ac Prevention initiated:  no   Wound Care Orders initiated:  no      WOC nurse consulted for Pressure Injury (Stage 3,4, Unstageable, DTI, NWPT, and Complex wounds) or Ac score 18 or lower:  No      Nurse 1 eSignature: Electronically signed by Flaco Pitts RN on 6/6/22 at 4:51 AM EDT    **SHARE this note so that the co-signing nurse is able to place an eSignature**    Nurse 2 eSignature: Electronically signed by Sugar Reddy RN on 6/6/22 at 2:52 PM EDT

## 2022-06-06 NOTE — H&P
Internal Medicine  PGY 1  History & Physical      CC  Right foot swelling     History Obtained From:  patient    HISTORY OF PRESENT ILLNESS:  Pt is a 39 yo M with a PMHx of T1DM on insulin pump, HLD, diabetic neuropathy who comes in from failed treatment as outpatient from right foot injury. Patient had dropped a power tool on his right foot which caused him to have a hot, swollen foot. He went to see his PCP who gave him cephalexin. He did that for 3 days and it did not get better so he returned to his PCP who put him on Bactrim. Patient was told if he did not feel better to come to the hospital. Pt denies any fevers, chills, nausea, vomiting, LE swelling. Patient is a smoker 1/2 pack for the past 20 years or more. Uses ETOH occasionally and denies any illicit drug use. Patient works a lot and would like to get back to work asap. ED course   Patient was stable with vitals WNL. Pt was resting comfortably with his right foot swelling but is able to bear weight on it. Labs showed elevated CRP 6.5. No leukocytosis. Foot xray showed no osteo and no trauma. Past Medical History:        Diagnosis Date    H/O keloid of skin     right shoulder, removed surgically     Hyperlipidemia     Insulin pump status     Type 1 diabetes mellitus with diabetic neuropathy (HCC)     A1c 11.3 5/2016   ·     Past Surgical History:        Procedure Laterality Date    SHOULDER SURGERY      SHOULDER SURGERY      arthoscopy of left shoulder   ·     Medications Priorto Admission:    · Medications Prior to Admission: sulfamethoxazole-trimethoprim (BACTRIM DS;SEPTRA DS) 800-160 MG per tablet, Take 1 tablet by mouth 2 times daily for 7 days  · Continuous Blood Gluc  (DEXCOM G6 ) SCOTT, Use as directed to monitor glucose levels. · Continuous Blood Gluc Sensor (DEXCOM G6 SENSOR) MISC, Use as directed to monitor glucose levels. Change sensor every 10 days.   · Continuous Blood Gluc Transmit (DEXCOM G6 TRANSMITTER) MISC, Use as directed to monitor glucose levels. Replace transmitter every 90 days  · cephALEXin (KEFLEX) 500 MG capsule, Take 1 capsule by mouth 2 times daily for 7 days (Patient not taking: Reported on 6/6/2022)  · ibuprofen (ADVIL;MOTRIN) 600 MG tablet, Take 1 tablet by mouth every 8 hours as needed for Pain  · HUMALOG 100 UNIT/ML injection vial,   · Multiple Vitamins-Minerals (THERAPEUTIC MULTIVITAMIN-MINERALS) tablet, Take 1 tablet by mouth daily  · Ascorbic Acid (VITAMIN C) 250 MG tablet, Take 250 mg by mouth daily  · atorvastatin (LIPITOR) 20 MG tablet, Take 20 mg by mouth daily. · aspirin 81 MG chewable tablet, Take 81 mg by mouth daily. · INSULIN INFUSION PUMP, by Implant route. Humolog, adjustable- insulin pump left side of abdomen. Allergies:  Hydrocodone-acetaminophen    Social History:   · TOBACCO:   reports that he has been smoking cigarettes. He has a 20.00 pack-year smoking history. He has never used smokeless tobacco.  · ETOH:   reports current alcohol use. · DRUGS : None  · Patient currently lives alone  ·   Family History:       Problem Relation Age of Onset    Cancer Mother         breast    Heart Disease Maternal Grandfather    ·     Review of Systems   Constitutional: Negative for chills and fever. HENT: Negative. Respiratory: Negative for cough and shortness of breath. Cardiovascular: Negative. Gastrointestinal: Negative. Genitourinary: Negative. Musculoskeletal: Positive for joint swelling and myalgias. All other systems reviewed and are negative. ROS: A 10 point review of systems was conducted, significant findings as noted in HPI. Physical Exam  Vitals and nursing note reviewed. Constitutional:       Appearance: Normal appearance. HENT:      Head: Normocephalic and atraumatic. Nose: Nose normal.      Mouth/Throat:      Pharynx: Oropharynx is clear. Eyes:      Extraocular Movements: Extraocular movements intact.       Conjunctiva/sclera: Conjunctivae normal.      Pupils: Pupils are equal, round, and reactive to light. Cardiovascular:      Rate and Rhythm: Normal rate and regular rhythm. Pulses: Normal pulses. Heart sounds: Normal heart sounds. Pulmonary:      Effort: Pulmonary effort is normal.      Breath sounds: Normal breath sounds. Abdominal:      General: Abdomen is flat. Bowel sounds are normal.      Palpations: Abdomen is soft. Musculoskeletal:         General: Swelling, tenderness and signs of injury present. Comments: Right foot on the dorsal side showed swelling with marking around the erythema. Skin puncture is seen on the top. Tender to touch. No sensory loss. Pulse +2 bilaterally    Skin:     General: Skin is warm. Capillary Refill: Capillary refill takes less than 2 seconds. Neurological:      General: No focal deficit present. Mental Status: He is alert and oriented to person, place, and time. Mental status is at baseline. Physical exam:       Vitals:    06/06/22 0410   BP: 112/75   Pulse: 56   Resp: 16   Temp: 97.2 °F (36.2 °C)   SpO2: 99%       DATA:    Labs:  CBC:   Recent Labs     06/05/22  2257   WBC 6.7   HGB 14.1   HCT 41.4          BMP:   Recent Labs     06/05/22  2257      K 4.5      CO2 25   BUN 19   CREATININE 1.0   GLUCOSE 157*     LFT's: No results for input(s): AST, ALT, ALB, BILITOT, ALKPHOS in the last 72 hours. Troponin: No results for input(s): TROPONINI in the last 72 hours. BNP:No results for input(s): BNP in the last 72 hours. ABGs: No results for input(s): PHART, ZSB4ICB, PO2ART in the last 72 hours. INR: No results for input(s): INR in the last 72 hours. U/A:No results for input(s): NITRITE, COLORU, PHUR, LABCAST, WBCUA, RBCUA, MUCUS, TRICHOMONAS, YEAST, BACTERIA, CLARITYU, SPECGRAV, LEUKOCYTESUR, UROBILINOGEN, BILIRUBINUR, BLOODU, GLUCOSEU, AMORPHOUS in the last 72 hours.     Invalid input(s): KETONESU    XR FOOT RIGHT (MIN 3 VIEWS)   Final Result      No radiographic evidence of acute osteomyelitis. ASSESSMENT AND PLAN:  Lili Joshi is a 39 yo M with a PMHx of T1DM on insulin pump, HLD, diabetic neuropathy who comes in from failed treatment as outpatient from right foot injury. LE Cellulitis  Patient had tried two Ab ( Keflex and Bactrim) as outpatient that has failed to resolve the cellulitis. Pt has had worsening pain for this right foot and swelling with erythema had gotten worse. -Podiatry consult  -Xray of foot was unremarkable  -Doxycycline + Rocephin   ( 6/6 -   -ID consult since patient failed 2 Ab therapy. T1DM  Patient manages his diabetes with pump. Ordered for his pump to be used during his hospital stay.  A1c sent      HLD  -Continue home statin     Will discuss with attending physician     Code Status:Full code  FEN: Adult Diet   PPX: Lovenox   DISPO: FELIX Coronado MD  6/6/2022,  4:27 AM

## 2022-06-06 NOTE — ED TRIAGE NOTES
Dropped palette keyona on right foot on Wednesday, was seen at [facility] for open wound and prescribed antibiotics. Pt states on Tuesday wound started getting worse. Yellow exudate noted in wound with redness and swelling around wound.

## 2022-06-06 NOTE — CONSULTS
Infectious Diseases Inpatient Consult Note    Reason for Consult:   R foot injury / cellulitis  Requesting Physician:   Ceftriaxone, Doxycycline  Primary Care Physician:  Ceci Daly MD  History Obtained From:   Pt, EPIC    Admit Date: 6/5/2022  Hospital Day: 2    CHIEF COMPLAINT:       Chief Complaint   Patient presents with    Foot Injury     dropped a palette keyona on right foot on wednesday with open wound. was seen at [facility] and prescribed antibiotics.  redness and swelling noted on tuesday and worsening since then     Wound Infection     diabetic, open wound worsening        HISTORY OF PRESENT ILLNESS:      41 yo man with hx DM, HL, cig use     Pt sustained R foot injury, dropped a tool (palate keyona, may be 1,000 lb !!) on his foot on 5/25  He developed swelling, seen by provider, rx Cephalexin on 5/31, had TMP/SMX added on 6/3   He had ongoing R foot swelling and pain    In ED 6/5, afeb, WBC 6.7  X-ray no fracture  Admit, started on Ceftriaxone + Doxycycyline    Seen by Podiatry, ordered foot CT       Past Medical History:    Past Medical History:   Diagnosis Date    H/O keloid of skin     right shoulder, removed surgically     Hyperlipidemia     Insulin pump status     Type 1 diabetes mellitus with diabetic neuropathy (HCC)     A1c 11.3 5/2016       Past Surgical History:    Past Surgical History:   Procedure Laterality Date    SHOULDER SURGERY      SHOULDER SURGERY      arthoscopy of left shoulder       Current Medications:     doxycycline (VIBRAMYCIN) IV  100 mg IntraVENous Q12H    sodium chloride flush  5-40 mL IntraVENous 2 times per day    enoxaparin  40 mg SubCUTAneous Daily    Insulin Pump - Bolus Dose   SubCUTAneous 4x Daily AC & HS    Insulin Pump - Basal Dose   SubCUTAneous Daily    aspirin  81 mg Oral Daily    atorvastatin  20 mg Oral Daily    cefTRIAXone (ROCEPHIN) IV  1,000 mg IntraVENous Q24H       Allergies:  Hydrocodone-acetaminophen    Social History:    TOBACCO:    + cig 1 ppd  ETOH:    Occasional   DRUGS:   None   MARITAL STATUS:      OCCUPATION:       Family History:   No immunodeficiency    REVIEW OF SYSTEMS:    No fever / chills / sweats. No weight loss. No visual change, eye pain, eye discharge. No oral lesion, sore throat, dysphagia. Denies cough / sputum. Denies chest pain, palpitations. Denies n / v / abd pain. No diarrhea. Denies dysuria or change in urinary function. Denies joint swelling or pain. No myalgia, arthralgia.  +  skin changes - R foot swelling and pain  Denies focal weakness, sensory change or other neurologic symptom    Denies new / worse depression, psychiatric symptoms    PHYSICAL EXAM:      Vitals:    /64   Pulse 61   Temp 97.7 °F (36.5 °C) (Oral)   Resp 16   Ht 6' (1.829 m)   Wt 164 lb 0.4 oz (74.4 kg)   SpO2 97%   BMI 22.25 kg/m²     GENERAL: No apparent distress.   RA  HEENT: Membranes moist, no oral lesion, PERRL  NECK:  Supple, no lymphadenopathy  LUNGS: Clear b/l, no rales, no dullness  CARDIAC: RRR, no murmur appreciated  ABD:  + BS, soft / NT  EXT:  R foot with dressing / ACE - picture in EPI, swelling, superficial dorsal ulceration  NEURO: No focal neurologic findings  PSYCH: Orientation, sensorium, mood normal  LINES:  Peripheral iv    DATA:    Lab Results   Component Value Date    WBC 6.7 2022    HGB 14.1 2022    HCT 41.4 2022    MCV 94.2 2022     2022     Lab Results   Component Value Date    CREATININE 0.9 2022    BUN 15 2022     (L) 2022    K 4.2 2022     2022    CO2 23 2022       Hepatic Function Panel:   Lab Results   Component Value Date    ALKPHOS 90 2016    ALT 27 2016    AST 28 2016    PROT 7.5 2016    PROT 5.6 2012    BILITOT 0.5 2016    BILIDIR <0.2 2016    IBILI see below 2016    LABALBU 3.4 2016       Micro:  6/5 Wound cult sent     Imagin/5 R foot x-ray  'No acute fracture. No evidence of cortical erosions or bone destruction. Mild soft tissue swelling on the dorsal aspect of the foot'      IMPRESSION:      Patient Active Problem List   Diagnosis    Cellulitis and abscess of other specified site    Type 1 diabetes mellitus ([facility]    DKA (diabetic ketoacidoses)    Closed nondisplaced fracture of lateral malleolus of right fibula    Closed displaced fracture of second metatarsal bone of right foot    Closed nondisplaced fracture of third metatarsal bone of right foot    Closed displaced fracture of medial cuneiform of right foot    Current smoker    Anxiety    Depression    Mixed diabetic hyperlipidemia associated with type 1 diabetes mellitus ([facility]    Right foot infection    Cellulitis       Hx DM, HL, cig use     R foot injury, dropped a tool on his foot   Poss occult MT fx   Poss cellulitis / superinfection    RECOMMENDATIONS:    Start Linezolid  D/c Ceftriaxone, Doxycycline  D/c Ceftriaxone, Doxycycline  Wound care, CT scan per Podaitry    Medical Decision Making: The following items were considered in medical decision making:  Discussion of patient care with other providers  Reviewed clinical lab tests  Reviewed radiology tests  Reviewed other diagnostic tests/interventions  Microbiology cultures and other micro tests reviewed      Risk of Complications/Morbidity: High   Illness(es)/ Infection present that pose threat to bodily function. There is potential for severe exacerbation of infection/side effects of treatment.   Therapy requires intensive monitoring for antimicrobial agent toxicity    Discussed with pt, Podiatry Resident, Medical Resident   Yeny Bolanos MD

## 2022-06-06 NOTE — PROGRESS NOTES
Progress Note    Admit Date: 6/5/2022  Diet: ADULT DIET; Regular    CC: foot injury     Interval history: Pt seen and examined at bedside. Vitals stable     HPI:      \" Patient presents with concern for right foot infection in the context of recent injury on 5/25. At that time, patient sustained blunt trauma to the dorsal aspect of his right foot after a pallet keyona accidentally fell on it. An x-ray performed at [facility] ED at that time did not demonstrate any acute osseous abnormalities. He was subsequently seen on Tuesday, 5/31 with concern for possible infection, and initiated on Keflex. He continued to take these as prescribed, but erythema around the wound continued to worsen. He was subsequently transition to Bactrim on Friday, 6/3; however, the wound has continued to drain and progress with respect to erythema and swelling. Denies any systemic signs of illness to include fever, chills, lightheadedness, chest pain, difficulty breathing, nausea, emesis, abdominal pain. 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  \"     Medications:     Scheduled Meds:   doxycycline (VIBRAMYCIN) IV  100 mg IntraVENous Q12H    sodium chloride flush  5-40 mL IntraVENous 2 times per day    enoxaparin  40 mg SubCUTAneous Daily    Insulin Pump - Bolus Dose   SubCUTAneous 4x Daily AC & HS    Insulin Pump - Basal Dose   SubCUTAneous Daily    aspirin  81 mg Oral Daily    atorvastatin  20 mg Oral Daily    cefTRIAXone (ROCEPHIN) IV  1,000 mg IntraVENous Q24H     Continuous Infusions:   sodium chloride      dextrose       PRN Meds:sodium chloride flush, sodium chloride, ondansetron **OR** ondansetron, polyethylene glycol, acetaminophen **OR** acetaminophen, glucose, dextrose bolus **OR** dextrose bolus, glucagon (rDNA), dextrose    Objective:   Vitals:   T-max:  Patient Vitals for the past 8 hrs:   BP Temp Temp src Pulse Resp SpO2 Height Weight   06/06/22 0739 108/64 97.7 °F (36.5 °C) Oral 61 16 97 % -- --   06/06/22 0421 -- -- -- -- -- -- 6' (1.829 m) 164 lb 0.4 oz (74.4 kg)   06/06/22 0410 112/75 97.2 °F (36.2 °C) Oral 56 16 99 % -- --   06/06/22 0130 120/61 -- -- 67 16 98 % -- --       Intake/Output Summary (Last 24 hours) at 6/6/2022 0902  Last data filed at 6/6/2022 0750  Gross per 24 hour   Intake 50 ml   Output 800 ml   Net -750 ml         Physical Exam  Physical Exam  Vitals and nursing note reviewed. Constitutional:       Appearance: Normal appearance. HENT:      Head: Normocephalic and atraumatic. Nose: Nose normal.      Mouth/Throat:      Pharynx: Oropharynx is clear. Eyes:      Extraocular Movements: Extraocular movements intact. Conjunctiva/sclera: Conjunctivae normal.      Pupils: Pupils are equal, round, and reactive to light. Cardiovascular:      Rate and Rhythm: Normal rate and regular rhythm. Pulses: Normal pulses. Heart sounds: Normal heart sounds. Pulmonary:      Effort: Pulmonary effort is normal.      Breath sounds: Normal breath sounds. Abdominal:      General: Abdomen is flat. Bowel sounds are normal.      Palpations: Abdomen is soft. Musculoskeletal:         General: Swelling, tenderness and signs of injury present. Comments: Right foot on the dorsal side showed swelling with marking around the erythema. Skin puncture is seen on the top. Tender to touch. No sensory loss. Pulse +2 bilaterally    Dressing with ace wrap, CDI     Skin:     General: Skin is warm. Capillary Refill: Capillary refill takes less than 2 seconds. Neurological:      General: No focal deficit present. Mental Status: He is alert and oriented to person, place, and time. Mental status is at baseline.      LABS:    CBC:   Recent Labs     06/05/22 2257   WBC 6.7   HGB 14.1   HCT 41.4      MCV 94.2     Renal:    Recent Labs     06/05/22 2257 06/06/22 0527    134*   K 4.5 4.2    103   CO2 25 23   BUN 19 15   CREATININE 1.0 0.9   GLUCOSE 157* 112*   CALCIUM 9.2 9.0   MG  --  1.90   ANIONGAP 9 8     Hepatic: No results for input(s): AST, ALT, BILITOT, BILIDIR, PROT, LABALBU, ALKPHOS in the last 72 hours. Troponin: No results for input(s): TROPONINI in the last 72 hours. BNP: No results for input(s): BNP in the last 72 hours. Lipids: No results for input(s): CHOL, HDL in the last 72 hours. Invalid input(s): LDLCALCU, TRIGLYCERIDE  ABGs:  No results for input(s): PHART, XMX5NTG, PO2ART, NQD2SOF, BEART, THGBART, P6XECTGV, IOA3BNX in the last 72 hours. INR: No results for input(s): INR in the last 72 hours. Lactate: No results for input(s): LACTATE in the last 72 hours. Cultures:  -----------------------------------------------------------------  RAD:   XR FOOT RIGHT (MIN 3 VIEWS)   Final Result      No radiographic evidence of acute osteomyelitis. CT FOOT RIGHT W WO CONTRAST    (Results Pending)       Assessment/Plan:   Rufus Vasquez is a 41 yo M with a PMHx of T1DM on insulin pump, HLD, diabetic neuropathy who comes in from failed treatment as outpatient from right foot injury.      LE Cellulitis  Patient had tried two Ab ( Keflex and Bactrim) as outpatient that has failed to resolve the cellulitis. Pt has had worsening pain for this right foot and swelling with erythema had gotten worse.   -hx of recurrent MRSA infection   -Podiatry consult  -Xray of foot was unremarkable  - f/u wound culture   - f/u CT foot R w wo contrast   -s/p one ose of ancef in ED   - s/p 1 dose rocephin and doxy on dose   - started on zyvox 600 mg q12h per ID   -ID consulted, appreciate recs   - podiatry consulted, appreciate recs   - pain mx with dilaudid      T1DM  Patient manages his diabetes with pump. Ordered for his pump to be used during his hospital stay.  A1c sent   - last A1c 11.3 in 2016      HLD  -Continue home statin         Code Status:Full code  FEN: Adult Diet   PPX: Lovenox   DISPO: Tanya Mac MD, PGY-1  06/06/22  9:02 AM    This patient has been staffed and discussed with Prasad Godoy MD.

## 2022-06-07 VITALS
OXYGEN SATURATION: 96 % | WEIGHT: 164.02 LBS | RESPIRATION RATE: 18 BRPM | HEIGHT: 72 IN | HEART RATE: 64 BPM | TEMPERATURE: 97.4 F | DIASTOLIC BLOOD PRESSURE: 71 MMHG | SYSTOLIC BLOOD PRESSURE: 118 MMHG | BODY MASS INDEX: 22.22 KG/M2

## 2022-06-07 LAB
ANION GAP SERPL CALCULATED.3IONS-SCNC: 11 MMOL/L (ref 3–16)
BASOPHILS ABSOLUTE: 0.1 K/UL (ref 0–0.2)
BASOPHILS RELATIVE PERCENT: 1.2 %
BUN BLDV-MCNC: 17 MG/DL (ref 7–20)
CALCIUM SERPL-MCNC: 9.4 MG/DL (ref 8.3–10.6)
CHLORIDE BLD-SCNC: 103 MMOL/L (ref 99–110)
CO2: 22 MMOL/L (ref 21–32)
CREAT SERPL-MCNC: 0.8 MG/DL (ref 0.9–1.3)
EOSINOPHILS ABSOLUTE: 0.3 K/UL (ref 0–0.6)
EOSINOPHILS RELATIVE PERCENT: 4.5 %
GFR AFRICAN AMERICAN: >60
GFR NON-AFRICAN AMERICAN: >60
GLUCOSE BLD-MCNC: 161 MG/DL (ref 70–99)
GLUCOSE BLD-MCNC: 190 MG/DL (ref 70–99)
HCT VFR BLD CALC: 44.2 % (ref 40.5–52.5)
HEMOGLOBIN: 14.9 G/DL (ref 13.5–17.5)
LYMPHOCYTES ABSOLUTE: 2 K/UL (ref 1–5.1)
LYMPHOCYTES RELATIVE PERCENT: 31 %
MAGNESIUM: 1.9 MG/DL (ref 1.8–2.4)
MCH RBC QN AUTO: 31.9 PG (ref 26–34)
MCHC RBC AUTO-ENTMCNC: 33.8 G/DL (ref 31–36)
MCV RBC AUTO: 94.5 FL (ref 80–100)
MONOCYTES ABSOLUTE: 0.7 K/UL (ref 0–1.3)
MONOCYTES RELATIVE PERCENT: 10.8 %
NEUTROPHILS ABSOLUTE: 3.4 K/UL (ref 1.7–7.7)
NEUTROPHILS RELATIVE PERCENT: 52.5 %
PDW BLD-RTO: 13.4 % (ref 12.4–15.4)
PERFORMED ON: ABNORMAL
PLATELET # BLD: 234 K/UL (ref 135–450)
PMV BLD AUTO: 8.4 FL (ref 5–10.5)
POTASSIUM REFLEX MAGNESIUM: 4.9 MMOL/L (ref 3.5–5.1)
RBC # BLD: 4.68 M/UL (ref 4.2–5.9)
SODIUM BLD-SCNC: 136 MMOL/L (ref 136–145)
WBC # BLD: 6.4 K/UL (ref 4–11)

## 2022-06-07 PROCEDURE — 99232 SBSQ HOSP IP/OBS MODERATE 35: CPT | Performed by: INTERNAL MEDICINE

## 2022-06-07 PROCEDURE — 36415 COLL VENOUS BLD VENIPUNCTURE: CPT

## 2022-06-07 PROCEDURE — 83735 ASSAY OF MAGNESIUM: CPT

## 2022-06-07 PROCEDURE — 85025 COMPLETE CBC W/AUTO DIFF WBC: CPT

## 2022-06-07 PROCEDURE — 6370000000 HC RX 637 (ALT 250 FOR IP)

## 2022-06-07 PROCEDURE — 80048 BASIC METABOLIC PNL TOTAL CA: CPT

## 2022-06-07 PROCEDURE — 6370000000 HC RX 637 (ALT 250 FOR IP): Performed by: INTERNAL MEDICINE

## 2022-06-07 PROCEDURE — 2580000003 HC RX 258

## 2022-06-07 RX ORDER — SULFAMETHOXAZOLE AND TRIMETHOPRIM 800; 160 MG/1; MG/1
1 TABLET ORAL 2 TIMES DAILY
Qty: 20 TABLET | Refills: 0 | Status: SHIPPED | OUTPATIENT
Start: 2022-06-07 | End: 2022-06-17

## 2022-06-07 RX ORDER — SULFAMETHOXAZOLE AND TRIMETHOPRIM 800; 160 MG/1; MG/1
1 TABLET ORAL 2 TIMES DAILY
Qty: 14 TABLET | Refills: 0 | Status: SHIPPED | OUTPATIENT
Start: 2022-06-07 | End: 2022-06-07 | Stop reason: SDUPTHER

## 2022-06-07 RX ORDER — LINEZOLID 600 MG/1
600 TABLET, FILM COATED ORAL EVERY 12 HOURS SCHEDULED
Qty: 14 TABLET | Refills: 0 | Status: SHIPPED | OUTPATIENT
Start: 2022-06-07 | End: 2022-06-07 | Stop reason: HOSPADM

## 2022-06-07 RX ADMIN — ACETAMINOPHEN 650 MG: 325 TABLET ORAL at 07:48

## 2022-06-07 RX ADMIN — SODIUM CHLORIDE, PRESERVATIVE FREE 10 ML: 5 INJECTION INTRAVENOUS at 07:50

## 2022-06-07 RX ADMIN — ATORVASTATIN CALCIUM 20 MG: 20 TABLET, FILM COATED ORAL at 07:48

## 2022-06-07 RX ADMIN — ASPIRIN 81 MG: 81 TABLET, CHEWABLE ORAL at 07:48

## 2022-06-07 RX ADMIN — LINEZOLID 600 MG: 600 TABLET, FILM COATED ORAL at 07:48

## 2022-06-07 ASSESSMENT — PAIN DESCRIPTION - LOCATION: LOCATION: FOOT

## 2022-06-07 ASSESSMENT — PAIN SCALES - GENERAL
PAINLEVEL_OUTOF10: 2

## 2022-06-07 ASSESSMENT — PAIN - FUNCTIONAL ASSESSMENT: PAIN_FUNCTIONAL_ASSESSMENT: ACTIVITIES ARE NOT PREVENTED

## 2022-06-07 ASSESSMENT — PAIN DESCRIPTION - DESCRIPTORS: DESCRIPTORS: ACHING

## 2022-06-07 ASSESSMENT — PAIN DESCRIPTION - PAIN TYPE: TYPE: ACUTE PAIN

## 2022-06-07 ASSESSMENT — PAIN DESCRIPTION - ORIENTATION: ORIENTATION: RIGHT

## 2022-06-07 NOTE — PROGRESS NOTES
Podiatric Surgery Daily Progress Note      Admit Date: 6/5/2022      Code:Full Code    Patient seen and examined, labs and records reviewed    Subjective:     Patient seen at bedside this AM.  Patient denies any overnight acute event. Patient denies f/c/n/v/sob/cp. Review of Systems: A 12 point review of symptoms is unremarkable with the exception of the chief complaint. Patient specifically denies nausea, fever, vomiting, chills, shortness of breath, chest pain, abdominal pain, constipation or difficulty urinating. Objective     /71   Pulse 64   Temp 97.4 °F (36.3 °C) (Oral)   Resp 18   Ht 6' (1.829 m)   Wt 164 lb 0.4 oz (74.4 kg)   SpO2 96%   BMI 22.25 kg/m²      I/O:    Intake/Output Summary (Last 24 hours) at 6/7/2022 0933  Last data filed at 6/7/2022 0912  Gross per 24 hour   Intake 1440 ml   Output 1575 ml   Net -135 ml              Wt Readings from Last 3 Encounters:   06/06/22 164 lb 0.4 oz (74.4 kg)   05/31/22 161 lb 9.6 oz (73.3 kg)   12/08/21 165 lb (74.8 kg)       LABS:    Recent Labs     06/05/22  2257 06/07/22  0534   WBC 6.7 6.4   HGB 14.1 14.9   HCT 41.4 44.2    234        Recent Labs     06/07/22  0534      K 4.9      CO2 22   BUN 17   CREATININE 0.8*      No results for input(s): PROT, INR, APTT in the last 72 hours. LOWER EXTREMITY EXAMINATION    Dressing to right LE intact. No strikethrough noted to the external dressing. No drainage noted to the internal layers of the dressing. VASCULAR: DP and PT pulses are palpable 2/4 b/l. Upon hand held doppler examination DP and PT pulses were noted to be biphasic. CFT is brisk to the digits of the foot b/l. Skin temperature is warm to cool from proximal to distal with focal calor noted. No edema noted. No pain with calf compression b/l.     NEUROLOGIC: Gross and epicritic sensation is intact b/l.  Protective sensation is intact at all pedal sites b/l.     DERMATOLOGIC: Diffuse dermatologic changes noted to is identified involving the base of the second metatarsal bone. No acute fractures are identified. 2. No evidence of any degenerative joint disease. ASSESSMENT/PLAN  -Superficial abrasions with superimposed cellulitis, right lower extremity  -Healed second metatarsal fracture, right lower extremity  -Type 1 diabetes mellitus      -Patient seen and examined at bedside this AM  -VSS, No leukocytosis noted (WBC 6.4)  -ESR 15 and CRP 6.5  -HbA1c 9.3  -prealbumin 17.7; ordered oral wound healing nutritional supplement  -Imaging reviewed, impression noted above  -Wound culture no WBCs or organisms seen  -Continue IV antibiotics per ID recommendations  -ID following; recommend linezolid  -Right lower extremity dressed with Betadine soaked gauze, DSD, and Ace   -post-op shoe ordered to patient room  -Partial heel weightbearing to the right lower extremity in postop shoe  -Weightbearing as tolerated to left lower extremity in normal shoe gear      DISPO:Superficial abrasions with superimposed cellulitis, right lower extremity. Cellulitis noted to be improving. Labs and imaging reviewed. Patient okay to discharge from podiatric standpoint pending medical clearance and ID recommendations. Patient assessment and plan was discussed with Dr. Shelley Min DPM.    Magda Lewis DPM   Podiatric Resident PGY1  Pager [phone] or [employer]  6/7/2022, 9:33 AM      Patient was seen and evaluated at bedside. Agree with residents assessment and treatment plan.   Shelley Min DPM

## 2022-06-07 NOTE — CARE COORDINATION
Case Management Assessment            Discharge Note                    Date / Time of Note: 6/7/2022 11:26 AM                  Discharge Note Completed by: Victor M Chavez RN    Patient Name: Shelbi Ford   YOB: 1975  Diagnosis: Cellulitis [L03.90]  Cellulitis of right lower extremity [L03.115]  Right foot infection [L08.9]   Date / Time: 6/5/2022  8:59 PM    Current PCP: Molly Mata MD  Clinic patient: No    Hospitalization in the last 30 days: No    Advance Directives:  Code Status: Full Code  [address] DNR form completed and on chart: Not Indicated    Financial:  Payor: Valentina Goodrich / Plan: [place] Samir / Product Type: *No Product type* /      Pharmacy:    [facility]/pharmacy #[id number] - [place], [address]  [address] [facility]. [facility] [address]  [address] [address]  Phone: [phone] Fax: [phone]    [facility] [phone] [place] [facility] [address] [facility], [address]  [phone] [facility] [phone]  [address] [place] [address] [address] [place] [address]  Phone: [phone] Fax: [phone]      Assistance purchasing medications?: Potential Assistance Purchasing Medications: No  Assistance provided by Case Management: None at this time    Does patient want to participate in local refill/ meds to beds program?:      Meds To Beds General Rules:  1. Can ONLY be done Monday- Friday between 8:30am-5pm  2. Prescription(s) must be in pharmacy by 3pm to be filled same day  3. Copy of patient's insurance/ prescription drug card and patient face sheet must be sent along with the prescription(s)  4. Cost of Rx cannot be added to hospital bill. If financial assistance is needed, please contact unit  or ;  or  CANNOT provide pharmacy voucher for patients co-pays  5.  Patients can then  the prescription on their way out of the hospital at discharge, or pharmacy can deliver to the bedside if staff is available. (payment due at time of pick-up or delivery - cash, check, or card accepted)     Able to afford home medications/ co-pay costs: Yes    ADLS:  Current PT AM-PAC Score:   /24  Current OT AM-PAC Score:   /24      DISCHARGE Disposition: Home- No Services Needed    LOC at discharge: Not Applicable  PIPER Completed: Not Indicated    Notification completed in HENS/PAS?:  Not Applicable    IMM Completed:   Not Indicated    Transportation:  Transportation PLAN for discharge: family   Mode of Transport: [facility]:  [address] ordered at discharge: Not [address]: Not Applicable  Orders faxed: No    Durable Medical Equipment:  DME Provider: none  Equipment obtained during hospitalization:     Home Oxygen and Respiratory Equipment:  Oxygen needed at discharge?: Not [address]: Not Applicable  Portable tank available for discharge?: Not Indicated    Dialysis:  Dialysis patient: No    Dialysis Center:  Not Applicable      Additional CM Notes: Pt from home with family support no needs at DC, will follow up OP with Pod surgical team     The Plan for Transition of Care is related to the following treatment goals of Cellulitis [L03.90]  Cellulitis of right lower extremity [L03.115]  Right foot infection [L08.9]    The Patient and/or patient representative [place] and his family were provided with a choice of provider and agrees with the discharge plan Yes    Freedom of choice list was provided with basic dialogue that supports the patient's individualized plan of care/goals and shares the quality data associated with the providers.  Not Indicated    Care Transitions patient: No    Brielle Mcgee  Case Management Department  Ph: [phone]  Fax: [phone]

## 2022-06-07 NOTE — PROGRESS NOTES
Pt received discharge instruction. pt verbalized understanding. All questions addressed. IV removed without complication.  Pt left with personal belongings

## 2022-06-07 NOTE — PROGRESS NOTES
Progress Note    Admit Date: 6/5/2022  Diet: ADULT DIET; Regular  ADULT ORAL NUTRITION SUPPLEMENT; Breakfast, Dinner; Wound Healing Oral Supplement    CC: foot injury     Interval history: Pt seen and examined at bedside. PEGGYO. Vitals stable     HPI:      \" Patient presents with concern for right foot infection in the context of recent injury on 5/25. At that time, patient sustained blunt trauma to the dorsal aspect of his right foot after a pallet keyona accidentally fell on it. An x-ray performed at [facility] ED at that time did not demonstrate any acute osseous abnormalities. He was subsequently seen on Tuesday, 5/31 with concern for possible infection, and initiated on Keflex. He continued to take these as prescribed, but erythema around the wound continued to worsen. He was subsequently transition to Bactrim on Friday, 6/3; however, the wound has continued to drain and progress with respect to erythema and swelling. Denies any systemic signs of illness to include fever, chills, lightheadedness, chest pain, difficulty breathing, nausea, emesis, abdominal pain. 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  \"     Medications:     Scheduled Meds:   sodium chloride flush  5-40 mL IntraVENous 2 times per day    enoxaparin  40 mg SubCUTAneous Daily    Insulin Pump - Bolus Dose   SubCUTAneous 4x Daily AC & HS    Insulin Pump - Basal Dose   SubCUTAneous Daily    aspirin  81 mg Oral Daily    atorvastatin  20 mg Oral Daily    linezolid  600 mg Oral 2 times per day     Continuous Infusions:   sodium chloride      dextrose       PRN Meds:sodium chloride flush, sodium chloride, ondansetron **OR** ondansetron, polyethylene glycol, acetaminophen **OR** acetaminophen, glucose, dextrose bolus **OR** dextrose bolus, glucagon (rDNA), dextrose    Objective:   Vitals:   T-max:  Patient Vitals for the past 8 hrs:   BP Temp Temp src Pulse Resp SpO2   06/07/22 0712 118/71 97.4 °F (36.3 °C) Oral ANIONGAP 9 8 11     Hepatic: No results for input(s): AST, ALT, BILITOT, BILIDIR, PROT, LABALBU, ALKPHOS in the last 72 hours. Troponin: No results for input(s): TROPONINI in the last 72 hours. BNP: No results for input(s): BNP in the last 72 hours. Lipids: No results for input(s): CHOL, HDL in the last 72 hours. Invalid input(s): LDLCALCU, TRIGLYCERIDE  ABGs:  No results for input(s): PHART, YUY1PUN, PO2ART, MUS3SNF, BEART, THGBART, T7WJSBAZ, KOS7NVR in the last 72 hours. INR: No results for input(s): INR in the last 72 hours. Lactate: No results for input(s): LACTATE in the last 72 hours. Cultures:  -----------------------------------------------------------------  RAD:   CT FOOT RIGHT WO CONTRAST   Final Result   1. Healed fracture deformity is identified involving the base of the second metatarsal bone. No acute fractures are identified. 2. No evidence of any degenerative joint disease. XR FOOT RIGHT (MIN 3 VIEWS)   Final Result      No radiographic evidence of acute osteomyelitis. Assessment/Plan:   Vika Frances is a 41 yo M with a PMHx of T1DM on insulin pump, HLD, diabetic neuropathy who comes in from failed treatment as outpatient from right foot injury.      LE Cellulitis  Patient had tried two Ab ( Keflex and Bactrim) as outpatient that has failed to resolve the cellulitis. Pt has had worsening pain for this right foot and swelling with erythema had gotten worse.   -hx of recurrent MRSA infection   -Podiatry consult  -Xray of foot was unremarkable  - CT R foot wo contrast revealing healed fracture deformity. - f/u wound culture   - f/u CT foot R w wo contrast   -s/p one ose of ancef in ED   - s/p 1 dose rocephin and doxy on dose   - started on zyvox 600 mg q12h per ID (6/6-)  - ID consulted, appreciate recs   - podiatry consulted, appreciate recs   - pain mx with dilaudid      T1DM  Patient manages his diabetes with pump.  Ordered for his pump to be used during his hospital stay.  A1c sent   - last A1c 11.3 in 2016   - A1c 6/7: 9.3      HLD  -Continue home statin         Code Status:Full code  FEN: Adult Diet   PPX: Lovenox   DISPO: Beatriz Oliver MD, PGY-1  06/07/22  9:23 AM    This patient has been staffed and discussed with Awais Dawson MD.

## 2022-06-07 NOTE — PLAN OF CARE
Problem: ABCDS Injury Assessment  Goal: Absence of physical injury  Outcome: Progressing  Flowsheets (Taken 6/7/2022 0047 by Norbert Aldana RN)  Absence of Physical Injury: Implement safety measures based on patient assessment     Problem: Pain  Goal: Verbalizes/displays adequate comfort level or baseline comfort level  Outcome: Progressing

## 2022-06-07 NOTE — PROGRESS NOTES
Patient A&Ox4. VSS. Patient remains to be up independently as tolerated w/ weight bearing on R heal only. Pt. States pain low - 2/10. Pt. Has insulin pump which administered 4 units at HS - BG reported at 210. Bed is locked and in lowest position, call light and table are within reach. No further needs at this time.

## 2022-06-07 NOTE — DISCHARGE INSTR - COC
Continuity of Care Form    Patient Name: Kody Miguel   :  1975  MRN:  [id number]    Admit date:  2022  Discharge date:  ***    Code Status Order: Full Code   Advance Directives:      Admitting Physician:  Alla Snyder MD  PCP: Izabela Ordonez MD    Discharging Nurse: [facility] Unit/Room#: 1242/0119-78  Discharging Unit Phone Number: ***    Emergency Contact:   Extended Emergency Contact Information  Primary Emergency Contact: Rhiannon Vallejo   [address] Phone: [phone]  Relation: Parent    Past Surgical History:  Past Surgical History:   Procedure Laterality Date    SHOULDER SURGERY      SHOULDER SURGERY      arthoscopy of left shoulder       Immunization History:   Immunization History   Administered Date(s) Administered    COVID-19, Maximilian Prakash, Primary or Immunocompromised, PF, 100mcg/0.5mL 2021, 2021    Influenza Virus Vaccine 2011, 2013, 10/12/2013, 10/01/2014, 10/06/2014, 10/06/2014, 10/07/2016, 10/31/2016, 2017, 10/31/2018    Influenza, MDCK Quadv, IM, PF (Flucelvax 2 yrs and older) 2017, 10/25/2018, 2019    Influenza, Sherrill Eli, IM, PF (6 mo and older Fluzone, Flulaval, Fluarix, and 3 yrs and older Afluria) 12/10/2021    Influenza, Sherrill Eli, Recombinant, IM PF (Flublok 18 yrs and older) 2020    Pneumococcal Polysaccharide ([id number]) 2003, 2010    Tdap (Boostrix, Adacel) 2015, 2018, 2021       Active Problems:  Patient Active Problem List   Diagnosis Code    Cellulitis and abscess of other specified site L03.818, L02.818    Type 1 diabetes mellitus ([facility] [address] E10.9    DKA (diabetic ketoacidoses) E11.10    Closed nondisplaced fracture of lateral malleolus of right fibula S82.64XA    Closed displaced fracture of second metatarsal bone of right foot S92.321A    Closed nondisplaced fracture of third metatarsal bone of right foot S92.334A    Closed displaced fracture of medial cuneiform of right foot B63.494W    Current smoker F17.200    Anxiety F41.9    Depression F32. A    Mixed diabetic hyperlipidemia associated with type 1 diabetes mellitus (HCC) E10.69, E78.2    Right foot infection L08.9    Cellulitis L03.90       Isolation/Infection:   Isolation            No Isolation          Patient Infection Status       Infection Onset Added Last Indicated Last Indicated By Review Planned Expiration Resolved Resolved By    None active    Resolved    COVID-19 (Rule Out) 12/15/21 12/15/21 12/15/21 COVID-19 (Ordered)   21 Rule-Out Test Resulted    COVID-19 (Rule Out) 21 COVID-19 (Ordered)   21 Rule-Out Test Resulted            Nurse Assessment:  Last Vital Signs: /71   Pulse 64   Temp 97.4 °F (36.3 °C) (Oral)   Resp 18   Ht 6' (1.829 m)   Wt 164 lb 0.4 oz (74.4 kg)   SpO2 96%   BMI 22.25 kg/m²     Last documented pain score (0-10 scale): Pain Level: 2  Last Weight:   Wt Readings from Last 1 Encounters:   22 164 lb 0.4 oz (74.4 kg)     Mental Status:  {IP PT MENTAL STATUS:45875}    IV Access:  { PIPER IV ACCESS:378937282}    Nursing Mobility/ADLs:  Walking   {CHP DME IHQO:140627487}  Transfer  {CHP DME ZCBX:503528081}  Bathing  {CHP DME QSJL:921404812}  Dressing  {CHP DME OSAM:813856649}  Toileting  {CHP DME ULSZ:481181989}  Feeding  {CHP DME RYMH:094291343}  Med Admin  {CHP DME QHAC:571387895}  Med Delivery   { PIPER MED Delivery:861838980}    Wound Care Documentation and Therapy:  Wound 22 Foot Anterior;Right (Active)   Wound Etiology Traumatic 22   Dressing/Treatment Ace wrap 22   Wound Assessment Erythema 22   Drainage Amount None 22   Odor None 22   Number of days: 1        Elimination:  Continence:    Bowel: {YES / C}  Bladder: {YES / VM:68594}  Urinary Catheter: {Urinary Catheter:139530469}   Colostomy/Ileostomy/Ileal Conduit: {YES / NA:61832}       Date of Last BM: ***    Intake/Output Summary (Last 24 hours) at 2022 0936  Last data filed at 2022 0912  Gross per 24 hour   Intake 1440 ml   Output 1575 ml   Net -135 ml     I/O last 3 completed shifts:   In: 12 [P.O.:960; IV Piggyback:50]  Out: 4165 [Urine:2375]    Safety Concerns:     [address]A Safety Concerns:319974792}    Impairments/Disabilities:      [address] PIPER Impairments/Disabilities:300661394}    Nutrition Therapy:  Current Nutrition Therapy:   [facility] Diet List:166818071}    Routes of Feeding: {CHP DME Other Feedings:845382934}  Liquids: {Slp liquid thickness:70103}  Daily Fluid Restriction: {CHP DME Yes amt example:969316370}  Last Modified Barium Swallow with Video (Video Swallowing Test): {Done Not Done WDPI:803587109}    Treatments at the Time of Hospital Discharge:   Respiratory Treatments: ***  Oxygen Therapy:  {Therapy; copd oxygen:32015}  Ventilator:    { CC Vent HNOU:145884911}    Rehab Therapies: {THERAPEUTIC INTERVENTION:4787187173}  Weight Bearing Status/Restrictions: [address] [facility] Weight Bearin}  Other Medical Equipment (for information only, NOT a DME order):  {EQUIPMENT:014816600}  Other Treatments: ***    Patient's personal belongings (please select all that are sent with patient):  {[facility] DME Belongings:179826349}    RN SIGNATURE:  {Esignature:478416433}    CASE MANAGEMENT/SOCIAL WORK SECTION    Inpatient Status Date: ***    Readmission Risk Assessment Score:  Readmission Risk              Risk of Unplanned Readmission:  9           Discharging to Facility/ Agency   Name:   Address:  Phone:  Fax:    Dialysis Facility (if applicable)   Name:  Address:  Dialysis Schedule:  Phone:  Fax:    / signature: {Esignature:771809746}    PHYSICIAN SECTION    Prognosis: {Prognosis:1194376694}    Condition at Discharge: [address] Patient Condition:922966274}    Rehab Potential (if transferring to Rehab): {Prognosis:0771256007}    Recommended Labs or Other Treatments After Discharge: ***    Podiatry Wound Care Discharge Instructions  Please perform every other day dressing changes to right lower extremity as follows  -Apply Betadine soaked gauze to the wound on the top of right lower leg  -Next apply gauze to the top of the right foot, and ankle  -Next loosely wrap the right lower extremity with Kerlix starting from just in front of the toes and ending just above the ankle  -Next gently wrap the right foot with 4 inch Ace bandage starting from just in front of the toes and ending just above the ankle  Patient is weightbearing as tolerated to bilateral lower extremities with postop shoe to the right lower extremity  Please follow-up with Dr. Clay Treadwell DPM for wound recheck 1 week after discharge    Physician Certification: I certify the above information and transfer of Josi Wang  is necessary for the continuing treatment of the diagnosis listed and that he requires {Admit to Appropriate Level of Care:64234} for {GREATER/LESS:119189606} 30 days.      Update Admission H&P: [facility]}    PHYSICIAN SIGNATURE:  {Esignature:249590834}

## 2022-06-07 NOTE — PROGRESS NOTES
ID Follow-up NOTE    CC:   R foot trauma / cellulitis  Antibiotics: Linezolid    Admit Date: 2022  Hospital Day: 3    Subjective:     Patient reports less R foot pain      Objective:     Patient Vitals for the past 8 hrs:   BP Temp Temp src Pulse Resp SpO2   22 0712 118/71 97.4 °F (36.3 °C) Oral 64 18 --   22 0423 114/74 97.6 °F (36.4 °C) Oral 65 16 96 %     I/O last 3 completed shifts: In: 1010 [P.O.:960; IV Piggyback:50]  Out: 8488 [Urine:2375]  I/O this shift: In: 720 [P.O.:720]  Out: -     EXAM:  GENERAL: No apparent distress. HEENT: Membranes moist, no oral lesion  NECK:  Supple, no lymphadenopathy  LUNGS: Clear b/l, no rales, no dullness  CARDIAC: RRR, no murmur appreciated  ABD:  + BS, soft / NT  EXT:  No rash, no edema, no lesions  R foot with dressing / ACE  NEURO: No focal neurologic findings  PSYCH: Orientation, sensorium, mood normal  LINES:  Peripheral iv       Data Review:  Lab Results   Component Value Date    WBC 6.4 2022    HGB 14.9 2022    HCT 44.2 2022    MCV 94.5 2022     2022     Lab Results   Component Value Date    CREATININE 0.8 (L) 2022    BUN 17 2022     2022    K 4.9 2022     2022    CO2 22 2022       Hepatic Function Panel:   Lab Results   Component Value Date    ALKPHOS 90 2016    ALT 27 2016    AST 28 2016    PROT 7.5 2016    PROT 5.6 2012    BILITOT 0.5 2016    BILIDIR <0.2 2016    IBILI see below 2016    LABALBU 3.4 2016       MICRO:  None     IMAGIN/6 R foot CT   1. Healed fracture deformity is identified involving the base of the second metatarsal bone. No acute fractures are identified.    2. No evidence of any degenerative joint disease      Scheduled Meds:   sodium chloride flush  5-40 mL IntraVENous 2 times per day    enoxaparin  40 mg SubCUTAneous Daily    Insulin Pump - Bolus Dose   SubCUTAneous 4x Daily AC & HS  Insulin Pump - Basal Dose   SubCUTAneous Daily    aspirin  81 mg Oral Daily    atorvastatin  20 mg Oral Daily    linezolid  600 mg Oral 2 times per day       Continuous Infusions:   sodium chloride      dextrose         PRN Meds:  sodium chloride flush, sodium chloride, ondansetron **OR** ondansetron, polyethylene glycol, acetaminophen **OR** acetaminophen, glucose, dextrose bolus **OR** dextrose bolus, glucagon (rDNA), dextrose      Assessment:     Hx DM, HL, cig use   No known antibiotic allergy     R foot injury, dropped a tool on his foot   R 2nd MT fx seen on CT   Poss cellulitis / superinfection      Plan:     Home on po Bactrim ds bid x 10 days  R foot - compression dressing, partial heel wtbearing, postop shoe per Podiatry    Medical Decision Making:   The following items were considered in medical decision making:  Discussion of patient care with other providers  Reviewed clinical lab tests  Reviewed radiology tests  Reviewed other diagnostic tests/interventions  Independent review of radiologic images  Microbiology cultures and other micro tests reviewed      Discussed with pt, RN  Brandon Sylvester MD

## 2022-06-07 NOTE — DISCHARGE SUMMARY
INTERNAL MEDICINE DEPARTMENT AT [address] [place] [address]  DISCHARGE SUMMARY    Patient ID: Eddy Hill                                             Discharge Date: 6/7/2022   Patient's PCP: Vero Vu MD                                          Discharge Physician: Hazel Yeager MD MD  Admit Date: 6/5/2022   Admitting Physician: Rufina Charles MD    PROBLEMS DURING HOSPITALIZATION:  Present on Admission:   Right foot infection   Cellulitis      DISCHARGE DIAGNOSES:  LE Cellulitis  T1DM  HDL     HPI:   Patient presents with concern for right foot infection in the context of recent injury on 5/25.  At that time, patient sustained blunt trauma to the dorsal aspect of his right foot after a pallet keyona accidentally fell on it.  An x-ray performed at [facility] ED at that time did not demonstrate any acute osseous abnormalities.  He was subsequently seen on Tuesday, 5/31 with concern for possible infection, and initiated on Calvin Blas continued to take these as prescribed, but erythema around the wound continued to worsen. Renetta Gomez was subsequently transition to Bactrim on Friday, 6/3; however, the wound has continued to drain and progress with respect to erythema and swelling.  Denies any systemic signs of illness to include fever, chills, lightheadedness, chest pain, difficulty breathing, nausea, emesis, abdominal pain.  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 Pt has had worsening pain for this right foot and swelling with erythema had gotten worse. Pt has a hx of recurrent MRSA infection. Podiatry was consulted. Xray of foot was unremarkable. CT R foot wo contrast revealing healed fracture deformity. wound culture was ordered, pending results.  pain mx with dilaudid pt received one dose of ancef in ED and 1 dose rocephin and doxy. ID consulted and pt wasstarted on zyvox 600 mg q12h per ID (6/6-6/7), pt to be D/C on bactrim for 10 days and to follow up with ID if signs and symptoms worsen      T1DM  Patient manages his diabetes with pump. Ordered for his pump to be used during his hospital stay. A1c 6/7: 9.3     Physical Exam:  Physical Exam   Physical Exam  Vitals and nursing note reviewed. Constitutional:       Appearance: Normal appearance. HENT:      Head: Normocephalic and atraumatic.      Nose: Nose normal.      Mouth/Throat:      Pharynx: Oropharynx is clear. Eyes:      Extraocular Movements: Extraocular movements intact.      Conjunctiva/sclera: Conjunctivae normal.      Pupils: Pupils are equal, round, and reactive to light. Cardiovascular:      Rate and Rhythm: Normal rate and regular rhythm.      Pulses: Normal pulses.      Heart sounds: Normal heart sounds. Pulmonary:      Effort: Pulmonary effort is normal.      Breath sounds: Normal breath sounds. Abdominal:      General: Abdomen is flat. Bowel sounds are normal.      Palpations: Abdomen is soft. Musculoskeletal:         General: Swelling, tenderness and signs of injury present.      Comments: Right foot on the dorsal side showed swelling with marking around the erythema. Skin puncture is seen on the top. Tender to touch. No sensory loss. Pulse +2 bilaterally    Dressing with ace wrap, CDI      Skin:     General: Skin is warm.      Capillary Refill: Capillary refill takes less than 2 seconds. Neurological:      General: No focal deficit present.      Mental Status: He is alert and oriented to person, place, and time.  Mental status is at baseline.      Consults: ID  Disposition: home  Discharged Condition: Stable  Follow Up: Primary Care Physician in one week    DISCHARGE MEDICATION:       Medication List      CONTINUE taking these medications    aspirin 81 MG chewable tablet     atorvastatin 20 MG tablet  Commonly known as: LIPITOR Dexcom G6  Winifred     Dexcom G6 Sensor Misc     Dexcom G6 Transmitter Misc     HumaLOG 100 UNIT/ML Soln injection vial  Generic drug: insulin lispro     ibuprofen 600 MG tablet  Commonly known as: ADVIL;MOTRIN  Take 1 tablet by mouth every 8 hours as needed for Pain     INSULIN INFUSION PUMP     sulfamethoxazole-trimethoprim 800-160 MG per tablet  Commonly known as: BACTRIM DS;SEPTRA DS  Take 1 tablet by mouth 2 times daily for 10 days     therapeutic multivitamin-minerals tablet     vitamin C 250 MG tablet        STOP taking these medications    cephALEXin 500 MG capsule  Commonly known as: Vanessa Stahl           Where to Get Your Medications      These medications were sent to [facility] [employer] - [address] -  [phone] Renard Homans [phone]  [address] [place] [address]    Phone: [phone]   · sulfamethoxazole-trimethoprim 800-160 MG per tablet          Activity: activity as tolerated  Diet: diabetic diet  Wound Care: keep wound clean and dry    Time Spent on discharge is more than 30 minutes    Signed:  Bianka Baum MD,  MD, PGY-1  6/7/2022

## 2022-06-09 LAB
GRAM STAIN RESULT: NORMAL
WOUND/ABSCESS: NORMAL

## 2022-06-13 NOTE — PROGRESS NOTES
Physician Progress Note      Helder Duncan  CSN #:                  [id number]  :                       1975  ADMIT DATE:       2022 8:59 PM  [address] DATE:        2022 12:51 PM  RESPONDING  PROVIDER #:        Audelia Ken MD          QUERY TEXT:    Pt admitted with right foot cellulitis. Pt noted to have DM Type 1. If   possible, please document in progress notes and discharge summary the   relationship, if any, between cellulitis and DM. The medical record reflects the following:  Risk Factors: 40year old male with right foot cellulitis and Type 1 DM with a   history of recurrent MRSA infections  Clinical Indicators: 22 Hemoglobin A1C- 9.3. H&P- 71-year-old male with   past medical history of type 1 diabetes mellitus on insulin pump, diabetic   peripheral neuropathy presented to the hospital with right foot swelling, pain   which started after trauma from a power tool while at work. Patient has been   taking oral antibiotics as outpatient prescribed by his PCP. He has tried   both Keflex and Bactrim without any significant improvement. ..last A1c 11.3 in   2016  Treatment: Labs, imaging, podiatry consult, ID consult, IV Ancef, Rocephin,   and Vibramycin, PO Zyvox, Keflex at d/c  Options provided:  -- Right foot cellulitis associated with Diabetes  -- Right foot cellulitis unrelated to Diabetes  -- Other - I will add my own diagnosis  -- Disagree - Not applicable / Not valid  -- Disagree - Clinically unable to determine / Unknown  -- Refer to Clinical Documentation Reviewer    PROVIDER RESPONSE TEXT:    Right foot cellulitis associated with Diabetes. Query created by:  Debby Cordon on 2022 11:48 AM      Electronically signed by:  Audelia Ken MD 2022 2:15 PM

## 2022-06-14 ENCOUNTER — OFFICE VISIT (OUTPATIENT)
Dept: FAMILY MEDICINE CLINIC | Age: 47
End: 2022-06-14
Payer: COMMERCIAL

## 2022-06-14 VITALS
BODY MASS INDEX: 21.02 KG/M2 | HEIGHT: 72 IN | WEIGHT: 155.2 LBS | DIASTOLIC BLOOD PRESSURE: 58 MMHG | SYSTOLIC BLOOD PRESSURE: 92 MMHG | OXYGEN SATURATION: 98 % | HEART RATE: 81 BPM | TEMPERATURE: 97.2 F

## 2022-06-14 DIAGNOSIS — E10.69 TYPE 1 DIABETES MELLITUS WITH OTHER SPECIFIED COMPLICATION (HCC): ICD-10-CM

## 2022-06-14 DIAGNOSIS — L03.115 CELLULITIS OF RIGHT LOWER EXTREMITY: Primary | ICD-10-CM

## 2022-06-14 DIAGNOSIS — L08.9 RIGHT FOOT INFECTION: ICD-10-CM

## 2022-06-14 DIAGNOSIS — Z09 HOSPITAL DISCHARGE FOLLOW-UP: ICD-10-CM

## 2022-06-14 PROCEDURE — 3046F HEMOGLOBIN A1C LEVEL >9.0%: CPT | Performed by: FAMILY MEDICINE

## 2022-06-14 PROCEDURE — 99213 OFFICE O/P EST LOW 20 MIN: CPT | Performed by: FAMILY MEDICINE

## 2022-06-14 PROCEDURE — 1111F DSCHRG MED/CURRENT MED MERGE: CPT | Performed by: FAMILY MEDICINE

## 2022-06-14 ASSESSMENT — PATIENT HEALTH QUESTIONNAIRE - PHQ9
SUM OF ALL RESPONSES TO PHQ QUESTIONS 1-9: 0
4. FEELING TIRED OR HAVING LITTLE ENERGY: 0
5. POOR APPETITE OR OVEREATING: 0
2. FEELING DOWN, DEPRESSED OR HOPELESS: 0
6. FEELING BAD ABOUT YOURSELF - OR THAT YOU ARE A FAILURE OR HAVE LET YOURSELF OR YOUR FAMILY DOWN: 0
1. LITTLE INTEREST OR PLEASURE IN DOING THINGS: 0
SUM OF ALL RESPONSES TO PHQ QUESTIONS 1-9: 0
8. MOVING OR SPEAKING SO SLOWLY THAT OTHER PEOPLE COULD HAVE NOTICED. OR THE OPPOSITE, BEING SO FIGETY OR RESTLESS THAT YOU HAVE BEEN MOVING AROUND A LOT MORE THAN USUAL: 0
9. THOUGHTS THAT YOU WOULD BE BETTER OFF DEAD, OR OF HURTING YOURSELF: 0
10. IF YOU CHECKED OFF ANY PROBLEMS, HOW DIFFICULT HAVE THESE PROBLEMS MADE IT FOR YOU TO DO YOUR WORK, TAKE CARE OF THINGS AT HOME, OR GET ALONG WITH OTHER PEOPLE: 0
SUM OF ALL RESPONSES TO PHQ9 QUESTIONS 1 & 2: 0
SUM OF ALL RESPONSES TO PHQ QUESTIONS 1-9: 0
3. TROUBLE FALLING OR STAYING ASLEEP: 0
7. TROUBLE CONCENTRATING ON THINGS, SUCH AS READING THE NEWSPAPER OR WATCHING TELEVISION: 0
SUM OF ALL RESPONSES TO PHQ QUESTIONS 1-9: 0

## 2022-06-14 NOTE — PROGRESS NOTES
Post-Discharge Transitional Care  Follow Up      Kody Miguel   YOB: 1975    Date of Office Visit:  6/14/2022  Date of Hospital Admission: 6/5/22  Date of Hospital Discharge: 6/7/22  Risk of hospital readmission (high >=14%. Medium >=10%) :Readmission Risk Score: 6.6 ( )      Care management risk score Rising risk (score 2-5) and Complex Care (Scores >=6): 1     Non face to face  following discharge, date last encounter closed (first attempt may have been earlier): *No documented post hospital discharge outreach found in the last 14 days    Call initiated 2 business days of discharge: *No response recorded in the last 14 days    ASSESSMENT/PLAN:   Cellulitis of right lower extremity, resolved  Right foot infection  Type 1 diabetes mellitus with other specified complication [facility])  Hospital discharge follow-up  -     MI DISCHARGE MEDS RECONCILED W/ CURRENT OUTPATIENT MED LIST      Medical Decision Making: low complexity  Return if symptoms worsen or fail to improve. On this date 6/14/2022 I have spent 30 minutes reviewing previous notes, test results and face to face with the patient discussing the diagnosis and importance of compliance with the treatment plan as well as documenting on the day of the visit. Subjective:   HPI:  Follow up of Hospital problems/diagnosis(es): Since being discharged from the hospital the wound is continuing to get better. Patient has now been doing wet-to-dry dressings. He states that is been a couple of days since he started to leave it open. Had starting to form a nice scab on top. No worsening redness erythema or any discharge any pain. Swelling has also considerably reduced since the discharge from the hospital.  He is continuing to take the antibiotics as prescribed. Sugars are well under control his fasting and postprandials are now staying less than 120. No reactions or side effect from the medications.   No chest pain palpitation shortness of breath lightheaded dizziness syncopal episodes    Inpatient course: Discharge summary reviewed- see chart. Patient Active Problem List   Diagnosis    Cellulitis and abscess of other specified site    Type 1 diabetes mellitus ([facility] [address]    DKA (diabetic ketoacidoses)    Closed nondisplaced fracture of lateral malleolus of right fibula    Closed displaced fracture of second metatarsal bone of right foot    Closed nondisplaced fracture of third metatarsal bone of right foot    Closed displaced fracture of medial cuneiform of right foot    Current smoker    Anxiety    Depression    Mixed diabetic hyperlipidemia associated with type 1 diabetes mellitus ([facility] [address]    Right foot infection    Cellulitis       Medications listed as ordered at the time of discharge from hospital     Medication List          Accurate as of June 14, 2022  2:21 PM. If you have any questions, ask your nurse or doctor.             CONTINUE taking these medications    aspirin 81 MG chewable tablet     atorvastatin 20 MG tablet  Commonly known as: LIPITOR     Dexcom G6  Winifred     Dexcom G6 Sensor Misc     Dexcom G6 Transmitter Misc     HumaLOG 100 UNIT/ML injection vial  Generic drug: insulin lispro     ibuprofen 600 MG tablet  Commonly known as: ADVIL;MOTRIN  Take 1 tablet by mouth every 8 hours as needed for Pain     INSULIN INFUSION PUMP     sulfamethoxazole-trimethoprim 800-160 MG per tablet  Commonly known as: BACTRIM DS;SEPTRA DS  Take 1 tablet by mouth 2 times daily for 10 days     therapeutic multivitamin-minerals tablet     vitamin C 250 MG tablet              Medications marked \"taking\" at this time  Outpatient Medications Marked as Taking for the 6/14/22 encounter (Office Visit) with Christopher Aguirre MD   Medication Sig Dispense Refill    sulfamethoxazole-trimethoprim (BACTRIM DS;SEPTRA DS) 800-160 MG per tablet Take 1 tablet by mouth 2 times daily for 10 days 20 tablet 0    Continuous Blood Gluc  (DEXCOM G6 ) SCOTT Use as directed to monitor glucose levels.  Continuous Blood Gluc Sensor (DEXCOM G6 SENSOR) MISC Use as directed to monitor glucose levels. Change sensor every 10 days.  Continuous Blood Gluc Transmit (DEXCOM G6 TRANSMITTER) MISC Use as directed to monitor glucose levels. Replace transmitter every 90 days      ibuprofen (ADVIL;MOTRIN) 600 MG tablet Take 1 tablet by mouth every 8 hours as needed for Pain 30 tablet 0    HUMALOG 100 UNIT/ML injection vial       Multiple Vitamins-Minerals (THERAPEUTIC MULTIVITAMIN-MINERALS) tablet Take 1 tablet by mouth daily      Ascorbic Acid (VITAMIN C) 250 MG tablet Take 250 mg by mouth daily      atorvastatin (LIPITOR) 20 MG tablet Take 20 mg by mouth daily.  aspirin 81 MG chewable tablet Take 81 mg by mouth daily.  INSULIN INFUSION PUMP by Implant route. Humolog, adjustable- insulin pump left side of abdomen. Medications patient taking as of now reconciled against medications ordered at time of hospital discharge: Yes    A comprehensive review of systems was negative except for what was noted in the HPI.     Objective:    BP (!) 92/58   Pulse 81   Temp 97.2 °F (36.2 °C) (Temporal)   Ht 6' (1.829 m)   Wt 155 lb 3.2 oz (70.4 kg)   SpO2 98%   BMI 21.05 kg/m²   General Appearance: alert and oriented to person, place and time, well developed and well- nourished, in no acute distress  Skin: warm and dry, no rash or erythema  Head: normocephalic and atraumatic  Eyes:extraocular eye movements intact, conjunctivae normal  ENT: tympanic membrane, external ear normal   pulmonary/Chest: clear to auscultation bilaterally- no wheezes, rales or rhonchi, normal air movement, no respiratory distress  Cardiovascular: normal rate, regular rhythm, normal S1 and S2, no murmurs, rubs, clicks, or gallops, distal pulses intact, no carotid bruits  Extremities: no cyanosis, clubbing or edema  Examination of the right foot reveals wound with a nice scab on top.  No erythema, drainage noted minimal swelling noted. Musculoskeletal: normal range of motion  Neurologic:gait, coordination and speech normal      An electronic signature was used to authenticate this note.   --Delia Schroeder MD

## 2022-07-05 LAB
AVERAGE GLUCOSE: NORMAL
HBA1C MFR BLD: 8.8 %

## 2022-08-11 NOTE — PROGRESS NOTES
ENDOSCOPY PREOP INSTRUCTIONS      You are scheduled for a procedure at [facility] on 8-12 @ 730. You will need to arrival by: 600 (at least an hour & a half prior to planned start time)  Report to the MAIN entrance on [address] and register at the surgery center on the left-hand side of the lobby  You will need your insurance card and photo id    For your procedure:     PLEASE FOLLOW ALL INSTRUCTIONS & PREPS GIVEN TO YOU BY YOUR DOCTOR'S OFFICE. If you have not received these instructions yet, please call the office immediately. Make sure to read them as soon as received. Bring an accurate list of any medications, including the dose/ frequency, with you on the day of the procedure. Make sure to include over the counter medications. If you are taking blood thinners, Aspirin or diabetic medication, make sure to call your doctor as soon as possible for instructions prior to your procedure. Please dress comfortably and do not wear any lotion, powders or jewelry  Arrange for someone to be with you and sign you out & drive you home after your procedure. We allow 2 adults visitors with you in the hospital & everyone is required to wear a mask.     WOMEN ONLY OF CHILDBEARING AGE: Please make sure to be able to give a urine sample on arrival      If you have further questions, you may contact your Endoscopist's office or Pre Admission Testing staff at [phone]

## 2022-08-12 ENCOUNTER — HOSPITAL ENCOUNTER (OUTPATIENT)
Age: 47
Setting detail: OUTPATIENT SURGERY
Discharge: HOME | End: 2022-08-12
Attending: INTERNAL MEDICINE | Admitting: INTERNAL MEDICINE
Payer: COMMERCIAL

## 2022-08-12 ENCOUNTER — ANESTHESIA (OUTPATIENT)
Dept: ENDOSCOPY | Age: 47
End: 2022-08-12
Payer: COMMERCIAL

## 2022-08-12 ENCOUNTER — ANESTHESIA EVENT (OUTPATIENT)
Dept: ENDOSCOPY | Age: 47
End: 2022-08-12
Payer: COMMERCIAL

## 2022-08-12 VITALS
HEART RATE: 59 BPM | OXYGEN SATURATION: 100 % | DIASTOLIC BLOOD PRESSURE: 71 MMHG | SYSTOLIC BLOOD PRESSURE: 121 MMHG | RESPIRATION RATE: 14 BRPM | TEMPERATURE: 96.7 F

## 2022-08-12 DIAGNOSIS — Z12.11 COLON CANCER SCREENING: ICD-10-CM

## 2022-08-12 LAB
GLUCOSE BLD-MCNC: 77 MG/DL (ref 70–99)
GLUCOSE BLD-MCNC: 88 MG/DL (ref 70–99)
PERFORMED ON: NORMAL
PERFORMED ON: NORMAL

## 2022-08-12 PROCEDURE — 3609010600 HC COLONOSCOPY POLYPECTOMY SNARE/COLD BIOPSY: Performed by: INTERNAL MEDICINE

## 2022-08-12 PROCEDURE — 7100000011 HC PHASE II RECOVERY - ADDTL 15 MIN: Performed by: INTERNAL MEDICINE

## 2022-08-12 PROCEDURE — 2709999900 HC NON-CHARGEABLE SUPPLY: Performed by: INTERNAL MEDICINE

## 2022-08-12 PROCEDURE — 2580000003 HC RX 258: Performed by: ANESTHESIOLOGY

## 2022-08-12 PROCEDURE — 6360000002 HC RX W HCPCS: Performed by: NURSE ANESTHETIST, CERTIFIED REGISTERED

## 2022-08-12 PROCEDURE — 88305 TISSUE EXAM BY PATHOLOGIST: CPT

## 2022-08-12 PROCEDURE — 7100000010 HC PHASE II RECOVERY - FIRST 15 MIN: Performed by: INTERNAL MEDICINE

## 2022-08-12 PROCEDURE — 3700000001 HC ADD 15 MINUTES (ANESTHESIA): Performed by: INTERNAL MEDICINE

## 2022-08-12 PROCEDURE — 3700000000 HC ANESTHESIA ATTENDED CARE: Performed by: INTERNAL MEDICINE

## 2022-08-12 PROCEDURE — 2580000003 HC RX 258: Performed by: NURSE ANESTHETIST, CERTIFIED REGISTERED

## 2022-08-12 RX ORDER — SODIUM CHLORIDE, SODIUM LACTATE, POTASSIUM CHLORIDE, CALCIUM CHLORIDE 600; 310; 30; 20 MG/100ML; MG/100ML; MG/100ML; MG/100ML
INJECTION, SOLUTION INTRAVENOUS CONTINUOUS PRN
Status: DISCONTINUED | OUTPATIENT
Start: 2022-08-12 | End: 2022-08-12 | Stop reason: SDUPTHER

## 2022-08-12 RX ORDER — LIDOCAINE HYDROCHLORIDE 20 MG/ML
INJECTION, SOLUTION INTRAVENOUS PRN
Status: DISCONTINUED | OUTPATIENT
Start: 2022-08-12 | End: 2022-08-12 | Stop reason: SDUPTHER

## 2022-08-12 RX ORDER — SODIUM CHLORIDE 9 MG/ML
INJECTION, SOLUTION INTRAVENOUS PRN
Status: DISCONTINUED | OUTPATIENT
Start: 2022-08-12 | End: 2022-08-12 | Stop reason: HOSPADM

## 2022-08-12 RX ORDER — SODIUM CHLORIDE, SODIUM LACTATE, POTASSIUM CHLORIDE, CALCIUM CHLORIDE 600; 310; 30; 20 MG/100ML; MG/100ML; MG/100ML; MG/100ML
INJECTION, SOLUTION INTRAVENOUS CONTINUOUS
Status: DISCONTINUED | OUTPATIENT
Start: 2022-08-12 | End: 2022-08-12 | Stop reason: HOSPADM

## 2022-08-12 RX ORDER — LIDOCAINE HYDROCHLORIDE 10 MG/ML
1 INJECTION, SOLUTION EPIDURAL; INFILTRATION; INTRACAUDAL; PERINEURAL
Status: DISCONTINUED | OUTPATIENT
Start: 2022-08-12 | End: 2022-08-12 | Stop reason: HOSPADM

## 2022-08-12 RX ORDER — PROPOFOL 10 MG/ML
INJECTION, EMULSION INTRAVENOUS CONTINUOUS PRN
Status: DISCONTINUED | OUTPATIENT
Start: 2022-08-12 | End: 2022-08-12 | Stop reason: SDUPTHER

## 2022-08-12 RX ORDER — PROPOFOL 10 MG/ML
INJECTION, EMULSION INTRAVENOUS PRN
Status: DISCONTINUED | OUTPATIENT
Start: 2022-08-12 | End: 2022-08-12 | Stop reason: SDUPTHER

## 2022-08-12 RX ORDER — SODIUM CHLORIDE 0.9 % (FLUSH) 0.9 %
5-40 SYRINGE (ML) INJECTION EVERY 12 HOURS SCHEDULED
Status: DISCONTINUED | OUTPATIENT
Start: 2022-08-12 | End: 2022-08-12 | Stop reason: HOSPADM

## 2022-08-12 RX ORDER — SODIUM CHLORIDE 0.9 % (FLUSH) 0.9 %
5-40 SYRINGE (ML) INJECTION PRN
Status: DISCONTINUED | OUTPATIENT
Start: 2022-08-12 | End: 2022-08-12 | Stop reason: HOSPADM

## 2022-08-12 RX ADMIN — PROPOFOL 150 MCG/KG/MIN: 10 INJECTION, EMULSION INTRAVENOUS at 07:37

## 2022-08-12 RX ADMIN — PROPOFOL 50 MG: 10 INJECTION, EMULSION INTRAVENOUS at 07:39

## 2022-08-12 RX ADMIN — SODIUM CHLORIDE, SODIUM LACTATE, POTASSIUM CHLORIDE, AND CALCIUM CHLORIDE: .6; .31; .03; .02 INJECTION, SOLUTION INTRAVENOUS at 07:31

## 2022-08-12 RX ADMIN — LIDOCAINE HYDROCHLORIDE 100 MG: 20 INJECTION, SOLUTION INTRAVENOUS at 07:36

## 2022-08-12 RX ADMIN — SODIUM CHLORIDE, POTASSIUM CHLORIDE, SODIUM LACTATE AND CALCIUM CHLORIDE: 600; 310; 30; 20 INJECTION, SOLUTION INTRAVENOUS at 06:57

## 2022-08-12 RX ADMIN — PROPOFOL 50 MG: 10 INJECTION, EMULSION INTRAVENOUS at 07:37

## 2022-08-12 ASSESSMENT — PAIN SCALES - GENERAL
PAINLEVEL_OUTOF10: 0

## 2022-08-12 ASSESSMENT — PAIN - FUNCTIONAL ASSESSMENT: PAIN_FUNCTIONAL_ASSESSMENT: 0-10

## 2022-08-12 NOTE — H&P
Pre-operative History and Physical    Patient: Elba Mays  : 1975  Acct#:     History Obtained From:  patient    HISTORY OF PRESENT ILLNESS:    The patient is a 56 y.o. male  who presents with screening colon    Past Medical History:        Diagnosis Date    H/O keloid of skin     right shoulder, removed surgically     Hyperlipidemia     Insulin pump status     Type 1 diabetes mellitus with diabetic neuropathy ([facility] [address]     A1c 11.3 2016     Past Surgical History:        Procedure Laterality Date    SHOULDER SURGERY      SHOULDER SURGERY      arthoscopy of left shoulder     Medications Prior to Admission:   No current facility-administered medications on file prior to encounter. Current Outpatient Medications on File Prior to Encounter   Medication Sig Dispense Refill    Continuous Blood Gluc  (DEXCOM G6 ) SCOTT Use as directed to monitor glucose levels. Continuous Blood Gluc Sensor (DEXCOM G6 SENSOR) MISC Use as directed to monitor glucose levels. Change sensor every 10 days. Continuous Blood Gluc Transmit (DEXCOM G6 TRANSMITTER) MISC Use as directed to monitor glucose levels. Replace transmitter every 90 days      HUMALOG 100 UNIT/ML injection vial       Multiple Vitamins-Minerals (THERAPEUTIC MULTIVITAMIN-MINERALS) tablet Take 1 tablet by mouth daily      atorvastatin (LIPITOR) 20 MG tablet Take 20 mg by mouth daily. aspirin 81 MG chewable tablet Take 81 mg by mouth daily. INSULIN INFUSION PUMP by Implant route. Humolog, adjustable- insulin pump left side of abdomen.           Allergies:  Hydrocodone-acetaminophen    History of allergic reaction to anesthesia:  No    PHYSICAL EXAM:      /66   Pulse 61   Temp (!) 96.7 °F (35.9 °C) (Temporal)   Resp 16   SpO2 100%  I        Heart:  Normal apical impulse, regular rate and rhythm, normal S1 and S2, no S3 or S4, and no murmur noted    Lungs:  No increased work of breathing, good air exchange, clear to auscultation bilaterally, no crackles or wheezing    Abdomen:  No scars, normal bowel sounds, soft, non-distended, non-tender, no masses palpated, no hepatosplenomegally      ASA Grade:  ASA 2 - Patient with mild systemic disease with no functional limitations      ASSESSMENT AND PLAN:    1. Patient is a 56 y.o. male here for colonoscopy with anesthesia  2. Procedure options, risks and benefits reviewed with patient. Patient expresses understanding.             Jayden Chappell MD  [address]  [phone]

## 2022-08-12 NOTE — ANESTHESIA POSTPROCEDURE EVALUATION
Department of Anesthesiology  Postprocedure Note    Patient: Stephy Lee  MRN: [id number]  YOB: 1975  Date of evaluation: 8/12/2022      Procedure Summary     Date: 08/12/22 Room / Location: [facility] [address]    Anesthesia Start: 8697 Anesthesia Stop: 2437    Procedure: COLONOSCOPY POLYPECTOMY SNARE/COLD BIOPSY Diagnosis:       Colon cancer screening      (Colon cancer screening [Z12.11])    Surgeons: Belle Austin MD Responsible Provider: Minesh Alexander DO    Anesthesia Type: MAC ASA Status: 2          Anesthesia Type: No value filed.     Shmuel Phase I: Shmuel Score: 10    Shmuel Phase II: Shmuel Score: 10      Anesthesia Post Evaluation    Patient location during evaluation: PACU  Patient participation: complete - patient participated  Level of consciousness: awake  Pain score: 0  Airway patency: patent  Nausea & Vomiting: no nausea and no vomiting  Complications: no  Cardiovascular status: blood pressure returned to baseline  Respiratory status: acceptable  Hydration status: euvolemic  Multimodal analgesia pain management approach

## 2022-08-12 NOTE — ANESTHESIA PRE PROCEDURE
Department of Anesthesiology  Preprocedure Note       Name:  Ezekiel Martinez   Age:  56 y.o.  :  1975                                          MRN:  [id number]         Date:  2022      Surgeon: Berry Beard):  Bubba Dailey MD    Procedure: Procedure(s):  COLONOSCOPY D    Medications prior to admission:   Prior to Admission medications    Medication Sig Start Date End Date Taking? Authorizing Provider   Continuous Blood Gluc  (DEXCOM G6 ) SCOTT Use as directed to monitor glucose levels. 21   Historical Provider, MD   Continuous Blood Gluc Sensor (DEXCOM G6 SENSOR) MISC Use as directed to monitor glucose levels. Change sensor every 10 days. 21   Historical Provider, MD   Continuous Blood Gluc Transmit (DEXCOM G6 TRANSMITTER) MISC Use as directed to monitor glucose levels. Replace transmitter every 90 days 21   Historical Provider, MD   ibuprofen (ADVIL;MOTRIN) 600 MG tablet Take 1 tablet by mouth every 8 hours as needed for Pain 21   MIKE Arizmendi - CNP   HUMALOG 100 UNIT/ML injection vial  16   Historical Provider, MD   Multiple Vitamins-Minerals (THERAPEUTIC MULTIVITAMIN-MINERALS) tablet Take 1 tablet by mouth daily    Historical Provider, MD   Ascorbic Acid (VITAMIN C) 250 MG tablet Take 250 mg by mouth daily    Historical Provider, MD   atorvastatin (LIPITOR) 20 MG tablet Take 20 mg by mouth daily. Historical Provider, MD   aspirin 81 MG chewable tablet Take 81 mg by mouth daily. Historical Provider, MD   INSULIN INFUSION PUMP by Implant route. Humolog, adjustable- insulin pump left side of abdomen. Historical Provider, MD       Current medications:    No current facility-administered medications for this encounter. Allergies:     Allergies   Allergen Reactions    Hydrocodone-Acetaminophen Nausea And Vomiting     N/V ( Can use Percocet)         Problem List:    Patient Active Problem List   Diagnosis Code    Cellulitis and abscess of other specified site L03.818, L02.818    Type 1 diabetes mellitus ([facility] [address] E10.9    DKA (diabetic ketoacidoses) E11.10    Closed nondisplaced fracture of lateral malleolus of right fibula S82.64XA    Closed displaced fracture of second metatarsal bone of right foot S92.321A    Closed nondisplaced fracture of third metatarsal bone of right foot S92.334A    Closed displaced fracture of medial cuneiform of right foot S92.241A    Current smoker F17.200    Anxiety F41.9    Depression F32. A    Mixed diabetic hyperlipidemia associated with type 1 diabetes mellitus (HCC) E10.69, E78.2    Right foot infection L08.9    Cellulitis L03.90       Past Medical History:        Diagnosis Date    H/O keloid of skin     right shoulder, removed surgically     Hyperlipidemia     Insulin pump status     Type 1 diabetes mellitus with diabetic neuropathy (HCC)     A1c 11.3 5/2016       Past Surgical History:        Procedure Laterality Date    SHOULDER SURGERY      SHOULDER SURGERY      arthoscopy of left shoulder       Social History:    Social History     Tobacco Use    Smoking status: Every Day     Packs/day: 1.00     Years: 20.00     Pack years: 20.00     Types: Cigarettes    Smokeless tobacco: Never   Substance Use Topics    Alcohol use: Yes     Comment: social, occas                                Ready to quit: Not Answered  Counseling given: Not Answered      Vital Signs (Current):   Vitals:    08/12/22 0641   BP: 102/66   Pulse: 61   Resp: 16   Temp: (!) 96.7 °F (35.9 °C)   TempSrc: Temporal   SpO2: 100%                                              BP Readings from Last 3 Encounters:   08/12/22 102/66   06/14/22 (!) 92/58   06/07/22 118/71       NPO Status:                                                                                 BMI:   Wt Readings from Last 3 Encounters:   06/14/22 155 lb 3.2 oz (70.4 kg)   06/06/22 164 lb 0.4 oz (74.4 kg)   05/31/22 161 lb 9.6 oz (73.3 kg)     There is no height or weight on file to calculate BMI.    CBC:   Lab Results   Component Value Date/Time    WBC 6.4 06/07/2022 05:34 AM    RBC 4.68 06/07/2022 05:34 AM    HGB 14.9 06/07/2022 05:34 AM    HCT 44.2 06/07/2022 05:34 AM    MCV 94.5 06/07/2022 05:34 AM    RDW 13.4 06/07/2022 05:34 AM     06/07/2022 05:34 AM       CMP:   Lab Results   Component Value Date/Time     06/07/2022 05:34 AM    K 4.9 06/07/2022 05:34 AM     06/07/2022 05:34 AM    CO2 22 06/07/2022 05:34 AM    BUN 17 06/07/2022 05:34 AM    CREATININE 0.8 06/07/2022 05:34 AM    GFRAA >60 06/07/2022 05:34 AM    GFRAA 146 03/06/2012 07:35 AM    LABGLOM >60 06/07/2022 05:34 AM    GLUCOSE 190 06/07/2022 05:34 AM    PROT 7.5 06/23/2016 11:09 PM    PROT 5.6 03/05/2012 05:50 AM    CALCIUM 9.4 06/07/2022 05:34 AM    BILITOT 0.5 06/23/2016 11:09 PM    ALKPHOS 90 06/23/2016 11:09 PM    AST 28 06/23/2016 11:09 PM    ALT 27 06/23/2016 11:09 PM       POC Tests: No results for input(s): POCGLU, POCNA, POCK, POCCL, POCBUN, POCHEMO, POCHCT in the last 72 hours.     Coags: No results found for: PROTIME, INR, APTT    HCG (If Applicable): No results found for: PREGTESTUR, PREGSERUM, HCG, HCGQUANT     ABGs: No results found for: PHART, PO2ART, NUH8AQR, BUY7FAQ, BEART, S1NBCAIW     Type & Screen (If Applicable):  No results found for: LABABO, LABRH    Drug/Infectious Status (If Applicable):  No results found for: HIV, HEPCAB    COVID-19 Screening (If Applicable):   Lab Results   Component Value Date/Time    COVID19 Not Detected 12/15/2021 02:29 PM           Anesthesia Evaluation  Patient summary reviewed and Nursing notes reviewed no history of anesthetic complications:   Airway: Mallampati: II  TM distance: >3 FB   Neck ROM: full  Mouth opening: > = 3 FB   Dental: normal exam         Pulmonary:Negative Pulmonary ROS and normal exam                               Cardiovascular:  Exercise tolerance: good (>4 METS),       (-)  angina, orthopnea and PND    ECG reviewed  Rhythm: regular  Rate: normal           Beta Blocker:  Not on Beta Blocker         Neuro/Psych:   Negative Neuro/Psych ROS              GI/Hepatic/Renal: Neg GI/Hepatic/Renal ROS            Endo/Other:    (+) DiabetesType II DM, , .                 Abdominal:       Abdomen: soft. Vascular: negative vascular ROS. Other Findings:           Anesthesia Plan      MAC     ASA 2       Induction: intravenous. Anesthetic plan and risks discussed with patient. Plan discussed with CRNA.                     Georges Shin DO   8/12/2022

## 2022-08-12 NOTE — DISCHARGE INSTRUCTIONS
ENDOSCOPY DISCHARGE INSTRUCTIONS:    Call the physician that did your procedure for any questions or concern:    [facility]: [phone]  DR. AMADOR [facility]        ACTIVITY:    There are potential side effects to the medications used for sedation and anesthesia during your procedure. These include:  Dizziness or light-headedness, confusion or memory loss, delayed reaction times, loss of coordination, nausea and vomiting. Because of your increased risk for injury, we ask that you observe the following precautions: For the next 24 hours,  DO NOT operate an automobile, bicycle, motorcycle, , power tools or large equipment of any kind. Do not drink alcohol, sign any legal documents or make any legal decisions for 24 hours. Do not bend your head over lower than your heart. DO sit on the side of bed/couch awhile before getting up. Plan on bedrest or quiet relaxation today. You may resume normal activities in 24 hours. DIET:    Your first meal today should be light, avoiding spicy and fatty foods. If you tolerate this first meal, then you may advance to your regular diet unless otherwise advised by your physician. NORMAL SYMPTOMS:  -Mild sore throat if youve had an EGD   -Gaseous discomfort    NOTIFY YOUR PHYSICIAN IF THESE SYMPTOMS OCCUR:  1. Fever (greater than 100)  5. Increased abdominal bloating  2. Severe pain    6. Excessive bleeding  3. Nausea and vomiting  7. Chest pain                                                                    4. Chills    8. Shortness of breath    ADDITIONAL INSTRUCTIONS:    Biopsy results: Call [address],[facility] for biopsy results in 1 week    Follow-up pathology results  High-fiber diet  Recommend surveillance colonoscopy in 10 years if the polyp pathology is hyperplastic, or in 5 years if it is adenoma    Educational Information:         Colon Polyps: Care Instructions  Your Care Instructions     Colon polyps are growths in the colon or the rectum.  The cause of most colon polyps is not known, and most people who get them do not have any problems. But a certain kind can turn into cancer. For this reason, regular testing for colon polyps is important for people as they get older. It is also important foranyone who has an increased risk for colon cancer. Polyps are usually found through routine colon cancer screening tests. Although most colon polyps are not cancerous, they are usually removed and then tested for cancer. Screening for colon cancer saves lives because the cancer canusually be cured if it is caught early. If you have a polyp that is the type that can turn into cancer, you may need more tests to examine your entire colon. The doctor will remove any otherpolyps that he or she finds, and you will be tested more often. Follow-up care is a key part of your treatment and safety. Be sure to make and go to all appointments, and call your doctor if you are having problems. It's also a good idea to know your test results and keep alist of the medicines you take. How can you care for yourself at home? Regular exams to look for colon polyps are the best way to prevent polyps from turning into colon cancer. These can include stool tests, sigmoidoscopy, colonoscopy, and CT colonography. Talk with your doctor about a testingschedule that is right for you. To prevent polyps  There is no home treatment that can prevent colon polyps. But these steps mayhelp lower your risk for cancer. Stay active. Being active can help you get to and stay at a healthy weight. Try to exercise on most days of the week. Walking is a good choice. Eat well. Choose a variety of vegetables, fruits, legumes (such as peas and beans), fish, poultry, and whole grains. Do not smoke. If you need help quitting, talk to your doctor about stop-smoking programs and medicines. These can increase your chances of quitting for good. If you drink alcohol, limit how much you drink.  Limit alcohol to 2 drinks a day for men and 1 drink a day for women. When should you call for help? Call your doctor now or seek immediate medical care if:    You have severe belly pain. Your stools are maroon or very bloody. Watch closely for changes in your health, and be sure to contact your doctor if:    You have a fever. You have nausea or vomiting. You have a change in bowel habits (new constipation or diarrhea). Your symptoms get worse or are not improving as expected. Where can you learn more? Go to [email or web address]://[email or web address]. org and sign in to your [employer] account. Enter [phone] [id number] in the [employer] box to learn more about \"Colon Polyps: Care Instructions. \"     If you do not have an account, please click on the \"Sign Up Now\" link. Current as of: September 8, 2021               Content Version: 13.3  © 2006-2022 [employer]. Care instructions adapted under license by [facility] ([facility]). If you have questions about a medical condition or this instruction, always ask your healthcare professional. [address] any warranty or liability for your use of this information. Please review these discharge instructions this evening or tomorrow for  information you may have forgotten. We want to thank you for choosing the [facility] as your health care provider. We always strive to provide you with excellent care while you are here. You may receive a survey in the mail regarding your care. We would appreciate you taking a few minutes of your time to complete this survey.

## 2022-08-12 NOTE — PROCEDURES
Colonoscopy Procedure Note      Patient: Wilson Butt  : 1975  Acct#:     Procedure: Colonoscopy with biopsy    Date:  2022    Surgeon:  Yvan Shipley MD,     Referring Physician:  Carmen Vazquez MD      Preoperative Diagnosis:    Screening colonoscopy, average risk      Consent:  The patient or their legal guardian has signed a consent, and is aware of the potential risks, benefits, alternatives, and potential complications of this procedure. These include, but are not limited to hemorrhage, bleeding, post procedural pain, perforation, phlebitis, aspiration, hypotension, hypoxia, cardiovascular events such as arryhthmia, and possibly death. Additionally, the possibility of missed colonic polyps and interval colon cancer was discussed in the consent. Anesthesia:  MAC        Procedure description:   An informed consent was obtained from the patient after explanation of indications, benefits, possible risks and complications of the procedure. The patient was then taken to the endoscopy suite, placed in the left lateral decubitus position, and the above IV anesthesia was administered. A digital rectal examination was performed and revealed negative without mass, lesions or tenderness. The Olympus video colonoscope was placed in the patient's rectum under digital direction and advanced to the cecum. The cecum was identified by IC valve and appendiceal orifice. The prep was good. The ileocecal valve was identified and  intubated. The scope was then withdrawn back through the cecum, ascending, transverse, descending and sigmoid colons. Careful circumferential examination of the mucosa was performed. The scope was then withdrawn into the rectum and retroflexed. The scope was straightened, the colon was decompressed and the scope was withdrawn from the patient. The patient tolerated the procedure well and was taken to the recovery room in good condition.     Complications: none  EBL: None    Findings:  Visualization of the terminal ileum demonstrated normal mucosa. The mucosa in cecum, ascending colon, transverse colon, descending colon, sigmoid colon  was normal.  3 mm polyp in the sigmoid colon removed by cold biopsy forceps. Mild sigmoid diverticulosis. Mild patchy erythema seen in the rectum, biopsies were obtained,? Prep related. Retroflexion in the rectum revealed small internal hemorrhoids. Impression:    3 mm polyp in the sigmoid colon removed by cold biopsy forceps. Mild sigmoid diverticulosis. Mild patchy erythema seen in the rectum, biopsies were obtained,? Prep related. Recommendations:     Follow-up pathology results  High-fiber diet  Recommend surveillance colonoscopy in 10 years if the polyp pathology is hyperplastic, or in 5 years if it is adenoma          Khadar Trejo MD  [facility]  [phone]    8/12/2022

## 2022-09-06 ENCOUNTER — OFFICE VISIT (OUTPATIENT)
Dept: SURGERY | Age: 47
End: 2022-09-06
Payer: COMMERCIAL

## 2022-09-06 ENCOUNTER — HOSPITAL ENCOUNTER (OUTPATIENT)
Dept: GENERAL RADIOLOGY | Age: 47
Discharge: HOME | End: 2022-09-06
Payer: COMMERCIAL

## 2022-09-06 ENCOUNTER — OFFICE VISIT (OUTPATIENT)
Dept: FAMILY MEDICINE CLINIC | Age: 47
End: 2022-09-06
Payer: COMMERCIAL

## 2022-09-06 ENCOUNTER — TELEPHONE (OUTPATIENT)
Dept: FAMILY MEDICINE CLINIC | Age: 47
End: 2022-09-06

## 2022-09-06 ENCOUNTER — HOSPITAL ENCOUNTER (OUTPATIENT)
Age: 47
Discharge: HOME | End: 2022-09-06
Payer: COMMERCIAL

## 2022-09-06 VITALS
WEIGHT: 155.4 LBS | DIASTOLIC BLOOD PRESSURE: 73 MMHG | TEMPERATURE: 98.4 F | BODY MASS INDEX: 21.05 KG/M2 | HEIGHT: 72 IN | HEART RATE: 98 BPM | SYSTOLIC BLOOD PRESSURE: 105 MMHG

## 2022-09-06 VITALS
WEIGHT: 155 LBS | SYSTOLIC BLOOD PRESSURE: 110 MMHG | HEART RATE: 78 BPM | OXYGEN SATURATION: 97 % | BODY MASS INDEX: 21.02 KG/M2 | TEMPERATURE: 97.3 F | DIASTOLIC BLOOD PRESSURE: 80 MMHG

## 2022-09-06 DIAGNOSIS — L03.116 CELLULITIS OF FOOT, LEFT: Primary | ICD-10-CM

## 2022-09-06 DIAGNOSIS — S90.32XA HEMATOMA OF LEFT FOOT: ICD-10-CM

## 2022-09-06 DIAGNOSIS — L02.91 ABSCESS: Primary | ICD-10-CM

## 2022-09-06 DIAGNOSIS — L02.91 ABSCESS: ICD-10-CM

## 2022-09-06 PROBLEM — L08.9 RIGHT FOOT INFECTION: Status: RESOLVED | Noted: 2022-06-06 | Resolved: 2022-09-06

## 2022-09-06 PROBLEM — S82.64XA CLOSED NONDISPLACED FRACTURE OF LATERAL MALLEOLUS OF RIGHT FIBULA: Status: RESOLVED | Noted: 2021-09-05 | Resolved: 2022-09-06

## 2022-09-06 PROBLEM — S92.334A CLOSED NONDISPLACED FRACTURE OF THIRD METATARSAL BONE OF RIGHT FOOT: Status: RESOLVED | Noted: 2021-09-05 | Resolved: 2022-09-06

## 2022-09-06 PROBLEM — S92.321A CLOSED DISPLACED FRACTURE OF SECOND METATARSAL BONE OF RIGHT FOOT: Status: RESOLVED | Noted: 2021-09-05 | Resolved: 2022-09-06

## 2022-09-06 PROCEDURE — 99214 OFFICE O/P EST MOD 30 MIN: CPT | Performed by: NURSE PRACTITIONER

## 2022-09-06 PROCEDURE — G8420 CALC BMI NORM PARAMETERS: HCPCS | Performed by: NURSE PRACTITIONER

## 2022-09-06 PROCEDURE — 4004F PT TOBACCO SCREEN RCVD TLK: CPT | Performed by: SURGERY

## 2022-09-06 PROCEDURE — G8427 DOCREV CUR MEDS BY ELIG CLIN: HCPCS | Performed by: SURGERY

## 2022-09-06 PROCEDURE — 4004F PT TOBACCO SCREEN RCVD TLK: CPT | Performed by: NURSE PRACTITIONER

## 2022-09-06 PROCEDURE — 99203 OFFICE O/P NEW LOW 30 MIN: CPT | Performed by: SURGERY

## 2022-09-06 PROCEDURE — G8427 DOCREV CUR MEDS BY ELIG CLIN: HCPCS | Performed by: NURSE PRACTITIONER

## 2022-09-06 PROCEDURE — 73630 X-RAY EXAM OF FOOT: CPT

## 2022-09-06 PROCEDURE — G8420 CALC BMI NORM PARAMETERS: HCPCS | Performed by: SURGERY

## 2022-09-06 RX ORDER — AMOXICILLIN AND CLAVULANATE POTASSIUM 875; 125 MG/1; MG/1
1 TABLET, FILM COATED ORAL 2 TIMES DAILY
Qty: 20 TABLET | Refills: 0 | Status: SHIPPED | OUTPATIENT
Start: 2022-09-06 | End: 2022-09-16

## 2022-09-06 NOTE — TELEPHONE ENCOUNTER
Pharmacy called to say that the Santyl ointment is not covered by his insurance and the out-of-pocket cost is exorbitant. Is there something else that could be prescribed or do we need to initiate a PA for the Santyl?     LOV [id number]

## 2022-09-06 NOTE — PROGRESS NOTES
Lucrecia Beckman (:  1975) is a 56 y.o. male,Established patient, here for evaluation of the following chief complaint(s): Other (MRSA )      ASSESSMENT/PLAN:  1. Abscess  -     amoxicillin-clavulanate (AUGMENTIN) 875-125 MG per tablet; Take 1 tablet by mouth 2 times daily for 10 days, Disp-20 tablet, R-0Normal  -     Bola Gómez MD, Surgical Oncology, [place] (colorectal, general, breast)  -     XR FOOT LEFT (MIN 3 VIEWS); Future  Concern for abscess or cellulitis. Patient instructed on wound care, to continue Bactrim until complete but start the Augmentin. He is to make a follow-up appointment with surgery if he does not a significant improvement over the next 2 days. Understands that if anytime he develops severe pain, significant increase in erythema, or fever he is to present to the hospital for possible IV antibiotics. No follow-ups on file. SUBJECTIVE/OBJECTIVE:  Patient is 12 days status post injury to the dorsum of his left foot. States he was had a tree limb fall down and crushed the top of his foot. The initial hematoma has dissipated however this is left an open wound on his foot. He was treated initially with a course of Keflex however 4 days ago noticed worsening of the redness and pain. He was then switched to Bactrim. Reports he had an x-ray done last week which showed no fracture in his foot. Reports now the pain is worse, the redness is increased. Additionally he is a type I diabetic with an A1c of  of 9.3. Current Outpatient Medications   Medication Sig Dispense Refill    amoxicillin-clavulanate (AUGMENTIN) 875-125 MG per tablet Take 1 tablet by mouth 2 times daily for 10 days 20 tablet 0    collagenase (SANTYL) 250 UNIT/GM ointment Apply topically daily for 10 days Apply topically daily. 30 g 0    Continuous Blood Gluc  (DEXCOM G6 ) SCOTT Use as directed to monitor glucose levels.       Continuous Blood Gluc Sensor (DEXCOM G6 SENSOR) MISC Use as directed to monitor glucose levels. Change sensor every 10 days. Continuous Blood Gluc Transmit (DEXCOM G6 TRANSMITTER) MISC Use as directed to monitor glucose levels. Replace transmitter every 90 days      HUMALOG 100 UNIT/ML injection vial       Multiple Vitamins-Minerals (THERAPEUTIC MULTIVITAMIN-MINERALS) tablet Take 1 tablet by mouth daily      atorvastatin (LIPITOR) 20 MG tablet Take 20 mg by mouth daily. aspirin 81 MG chewable tablet Take 81 mg by mouth daily. INSULIN INFUSION PUMP by Implant route. Humolog, adjustable- insulin pump left side of abdomen. No current facility-administered medications for this visit. Review of Systems   All other systems reviewed and are negative. Vitals:    09/06/22 1423   BP: 110/80   Site: Left Upper Arm   Position: Sitting   Cuff Size: Medium Adult   Pulse: 78   Temp: 97.3 °F (36.3 °C)   SpO2: 97%   Weight: 155 lb (70.3 kg)       Physical Exam              An electronic signature was used to authenticate this note.     --MIKE Castanon - CNP

## 2022-09-06 NOTE — ASSESSMENT & PLAN NOTE
Imaging today  Discussed case with Dr. Sophie So. Patient will start Augmentin twice daily as ordered, sent till to the central area of the wound and follow-up with surgery if no significant improvement over the next few days.

## 2022-09-06 NOTE — TELEPHONE ENCOUNTER
Pt called and stated that he has an opened wound and it is infected. He went to the ER Thursday and was put on antibiotics. Now he states that he has MRSA. Should he go to the urgent care or be seen in our office?     Please call pt at: [phone]    LOV: 6/14/22

## 2022-09-07 RX ORDER — SULFAMETHOXAZOLE AND TRIMETHOPRIM 800; 160 MG/1; MG/1
TABLET ORAL
COMMUNITY
Start: 2022-09-02

## 2022-09-07 RX ORDER — POLYETHYLENE GLYCOL-3350 AND ELECTROLYTES WITH FLAVOR PACK 240; 5.84; 2.98; 6.72; 22.72 G/278.26G; G/278.26G; G/278.26G; G/278.26G; G/278.26G
POWDER, FOR SOLUTION ORAL
COMMUNITY
Start: 2022-06-03

## 2022-09-07 RX ORDER — IBUPROFEN 600 MG/1
TABLET ORAL
COMMUNITY
Start: 2022-08-25

## 2022-09-07 RX ORDER — SULFAMETHOXAZOLE AND TRIMETHOPRIM 800; 160 MG/1; MG/1
1 TABLET ORAL 2 TIMES DAILY
COMMUNITY
Start: 2022-09-02 | End: 2022-09-09

## 2022-09-07 RX ORDER — CEPHALEXIN 500 MG/1
CAPSULE ORAL
COMMUNITY
Start: 2022-08-25

## 2022-09-07 NOTE — TELEPHONE ENCOUNTER
Submitted PA for Santyl 250UNIT/GM ointment  Via CMM Key: [id number] STATUS: Per [facility] - the medication requested has no eligible National Drug Codes (NDCs) enrolled in the Medicaid Drug Rebate Program. A medication must be eligible with the Medicaid Drug Rebate Program to qualify for coverage. Please update the script to a product that is covered by this program. Letter attached. If this requires a response please respond to the pool ( [facility] [address]). Thank you please advise patient.

## 2022-10-05 ENCOUNTER — TELEPHONE (OUTPATIENT)
Dept: SURGERY | Age: 47
End: 2022-10-05

## 2022-10-05 NOTE — PROGRESS NOTES
[facility] [address] [place] [address]    HPI:  Dear Dr. Benita Hunter, Thank you for referring Mr. Tree Mccauley for management of left foot swelling. Patient had injury to left foot approx 2 weeks ago. He had X rays which ruled out fracture and is also on antibiotics. Saw PCP today and here for further management. No fevers. Past Medical History:   Diagnosis Date    H/O keloid of skin     right shoulder, removed surgically     Hyperlipidemia     Insulin pump status     Type 1 diabetes mellitus with diabetic neuropathy (HCC)     A1c 11.3 5/2016     Past Surgical History:   Procedure Laterality Date    COLONOSCOPY N/A 8/12/2022    COLONOSCOPY POLYPECTOMY SNARE/COLD BIOPSY performed by Tae Perdomo MD at [id number] [place] [address]      arthoscopy of left shoulder     Social:   Social History     Tobacco Use    Smoking status: Every Day     Packs/day: 1.00     Years: 20.00     Pack years: 20.00     Types: Cigarettes    Smokeless tobacco: Never   Vaping Use    Vaping Use: Former   Substance Use Topics    Alcohol use: Yes     Comment: social, occas    Drug use: No     Family History   Problem Relation Age of Onset    Cancer Mother         breast    Heart Disease Maternal Grandfather      Allergies: Hydrocodone-acetaminophen  Current Outpatient Medications   Medication Sig Dispense Refill    Continuous Blood Gluc  (DEXCOM G6 ) SCOTT Use as directed to monitor glucose levels. Continuous Blood Gluc Sensor (DEXCOM G6 SENSOR) MISC Use as directed to monitor glucose levels. Change sensor every 10 days. Continuous Blood Gluc Transmit (DEXCOM G6 TRANSMITTER) MISC Use as directed to monitor glucose levels. Replace transmitter every 90 days      HUMALOG 100 UNIT/ML injection vial       Multiple Vitamins-Minerals (THERAPEUTIC MULTIVITAMIN-MINERALS) tablet Take 1 tablet by mouth daily      atorvastatin (LIPITOR) 20 MG tablet Take 20 mg by mouth daily. aspirin 81 MG chewable tablet Take 81 mg by mouth daily. INSULIN INFUSION PUMP by Implant route. Humolog, adjustable- insulin pump left side of abdomen. cephALEXin (KEFLEX) 500 MG capsule TAKE 1 CAPSULE BY MOUTH 4 TIMES DAILY FOR 10 DAYS.      ibuprofen (ADVIL;MOTRIN) 600 MG tablet TAKE 1 TABLET BY MOUTH 3 (THREE) TIMES DAILY AS NEEDED FOR UP TO 5 DAYS. GAVILYTE-C 240 g solution 1 (ONE) KIT ONCE , USE AS DIRECTED BY PHYSICIANS OFFICE      sulfamethoxazole-trimethoprim (BACTRIM DS;SEPTRA DS) 800-160 MG per tablet TAKE 1 TABLET BY MOUTH TWICE A DAY FOR 7 DAYS       No current facility-administered medications for this visit. Review of Systems: See HPI  All other systems reviewed and are negative. Vitals:    09/06/22 1506   BP: 105/73   Site: Left Upper Arm   Pulse: 98   Temp: 98.4 °F (36.9 °C)   TempSrc: Temporal   Weight: 155 lb 6.4 oz (70.5 kg)   Height: 6' (1.829 m)     Wt Readings from Last 3 Encounters:   09/06/22 155 lb 6.4 oz (70.5 kg)   09/06/22 155 lb (70.3 kg)   06/14/22 155 lb 3.2 oz (70.4 kg)     Body mass index is 21.08 kg/m². Physical Exam:   Constitutional: He is oriented to person, place, and time. He appears well-developed and well-nourished. No distress. HENT: Moist mucus membranes  Head: Normocephalic and atraumatic. Eyes: EOM are normal. Pupils are equal, round, and no icterus. Cardiovascular: Normal rate and regular rhythm by peripheral pulses. Pulmonary/Chest: No respiratory distress. Bilateral symmetrical chest rise. Extremities: He exhibits no bilateral edema. Left foot: Image in PCP note. Swelling and erythema with central compromised skin. Neurological: Grossly intact motor and sensory exam.   Skin: Skin is warm and dry. Psychiatric: He has a normal mood and affect.  Appropriate thought process and judgement capacity    No results found for: BEVGRNA  Hemoglobin   Date Value Ref Range Status   06/07/2022 14.9 13.5 - 17.5 g/dL Final Hematocrit   Date Value Ref Range Status   06/07/2022 44.2 40.5 - 52.5 % Final     No results found for: CEA  No results found for:   No results found for: ,     Assessment/Plan:   Diagnosis Orders   1. Cellulitis of foot, left        2. Hematoma of left foot          Patient appears to have slowly resolving hematoma. Difficult to say on how much of redness is reactive vs on going infection. Discussed about further management. Best option is to open central area and drain hematoma / possible abscess. Patient is not interested in any surgical intervention at this point. Leg elevation and wound care discussed. Explained about symptoms that need attention including expanding redness and fever. Follow up this Friday or sooner if needed. Discussed with pt PCP.     Elder Barahona MD  Surgical Oncologist

## 2023-01-26 ENCOUNTER — HOSPITAL ENCOUNTER (EMERGENCY)
Age: 48
Discharge: HOME | End: 2023-01-26
Attending: EMERGENCY MEDICINE
Payer: COMMERCIAL

## 2023-01-26 ENCOUNTER — APPOINTMENT (OUTPATIENT)
Dept: GENERAL RADIOLOGY | Age: 48
End: 2023-01-26
Payer: COMMERCIAL

## 2023-01-26 VITALS
TEMPERATURE: 98.2 F | HEART RATE: 87 BPM | WEIGHT: 165.5 LBS | SYSTOLIC BLOOD PRESSURE: 138 MMHG | RESPIRATION RATE: 16 BRPM | OXYGEN SATURATION: 100 % | DIASTOLIC BLOOD PRESSURE: 87 MMHG | HEIGHT: 72 IN | BODY MASS INDEX: 22.42 KG/M2

## 2023-01-26 DIAGNOSIS — R05.1 ACUTE COUGH: Primary | ICD-10-CM

## 2023-01-26 LAB
RAPID INFLUENZA  B AGN: NEGATIVE
RAPID INFLUENZA A AGN: NEGATIVE

## 2023-01-26 PROCEDURE — 71045 X-RAY EXAM CHEST 1 VIEW: CPT

## 2023-01-26 PROCEDURE — U0003 INFECTIOUS AGENT DETECTION BY NUCLEIC ACID (DNA OR RNA); SEVERE ACUTE RESPIRATORY SYNDROME CORONAVIRUS 2 (SARS-COV-2) (CORONAVIRUS DISEASE [COVID-19]), AMPLIFIED PROBE TECHNIQUE, MAKING USE OF HIGH THROUGHPUT TECHNOLOGIES AS DESCRIBED BY CMS-2020-01-R: HCPCS

## 2023-01-26 PROCEDURE — 99284 EMERGENCY DEPT VISIT MOD MDM: CPT

## 2023-01-26 PROCEDURE — 87804 INFLUENZA ASSAY W/OPTIC: CPT

## 2023-01-26 PROCEDURE — U0005 INFEC AGEN DETEC AMPLI PROBE: HCPCS

## 2023-01-26 RX ORDER — PSEUDOEPHEDRINE HCL 30 MG
30 TABLET ORAL EVERY 6 HOURS PRN
Qty: 12 TABLET | Refills: 1 | Status: SHIPPED | OUTPATIENT
Start: 2023-01-26 | End: 2024-01-26

## 2023-01-26 RX ORDER — GUAIFENESIN/DEXTROMETHORPHAN 100-10MG/5
5 SYRUP ORAL 3 TIMES DAILY PRN
Qty: 120 ML | Refills: 0 | Status: SHIPPED | OUTPATIENT
Start: 2023-01-26 | End: 2023-02-05

## 2023-01-27 LAB — SARS-COV-2, PCR: NOT DETECTED

## 2023-01-27 NOTE — ED NOTES
Pt dc/d with instructions in stable condition, ambulatory to lobby. Home per ride.       Michelle Bashir RN  01/26/23 2007

## 2023-01-27 NOTE — ED PROVIDER NOTES
[id number] [address] ENCOUNTER        Pt Name: Bhavesh Johns  MRN: [id number]  [place] 1975  Date of evaluation: 1/26/2023  Provider: Ramses Beverly MD  PCP: Elisabeth Bamberger, MD  Note Started: 5:27 AM EST 1/27/23    CHIEF COMPLAINT       Chief Complaint   Patient presents with    Cough       HISTORY OF PRESENT ILLNESS: 1 or more Elements   History From: Patient        Bhavesh Johns is a 56 y.o. male who presents for evaluation of cough and nasal congestion. Patient reports a 2 to 3-day history of symptoms. He reports multiple sick contacts at work with similar symptoms. Denies fevers or difficulties breathing. Denies a history of cardiopulmonary disease. He reports he is taken over-the-counter occasions without significant improvement. Denies pain or swelling to lower extremities. Nursing Notes were all reviewed and agreed with or any disagreements were addressed in the HPI. REVIEW OF SYSTEMS :      Review of Systems    Positives and Pertinent negatives as per HPI. SURGICAL HISTORY     Past Surgical History:   Procedure Laterality Date    COLONOSCOPY N/A 8/12/2022    COLONOSCOPY POLYPECTOMY SNARE/COLD BIOPSY performed by Benny Escobedo MD at [address] [place] [address]      arthoscopy of left shoulder       CURRENTMEDICATIONS       Discharge Medication List as of 1/26/2023  7:57 PM        CONTINUE these medications which have NOT CHANGED    Details   Continuous Blood Gluc  (DEXCOM G6 ) SCOTT Use as directed to monitor glucose levels. Historical Med      Continuous Blood Gluc Sensor (DEXCOM G6 SENSOR) MISC Use as directed to monitor glucose levels. Change sensor every 10 days. Historical Med      Continuous Blood Gluc Transmit (DEXCOM G6 TRANSMITTER) MISC Use as directed to monitor glucose levels.  Replace transmitter every 90 daysHistorical Med      HUMALOG 100 UNIT/ML injection vial DAWHistorical Med      Multiple Vitamins-Minerals (THERAPEUTIC MULTIVITAMIN-MINERALS) tablet Take 1 tablet by mouth dailyHistorical Med      atorvastatin (LIPITOR) 20 MG tablet Take 20 mg by mouth daily. Historical Med      aspirin 81 MG chewable tablet Take 81 mg by mouth daily. Historical Med      INSULIN INFUSION PUMP by Implant route. Humolog, adjustable- insulin pump left side of abdomen. ALLERGIES     Hydrocodone-acetaminophen    FAMILYHISTORY       Family History   Problem Relation Age of Onset    Cancer Mother         breast    Heart Disease Maternal Grandfather         SOCIAL HISTORY       Social History     Tobacco Use    Smoking status: Every Day     Packs/day: 1.00     Years: 20.00     Pack years: 20.00     Types: Cigarettes    Smokeless tobacco: Never   Vaping Use    Vaping Use: Former   Substance Use Topics    Alcohol use: Yes     Comment: social, occas    Drug use: No       SCREENINGS        Carey Coma Scale  Eye Opening: Spontaneous  Best Verbal Response: Oriented  Best Motor Response: Obeys commands  [place] Coma Scale Score: 15                CIWA Assessment  BP: 138/87  Heart Rate: 87           PHYSICAL EXAM  1 or more Elements     ED Triage Vitals [01/26/23 1814]   BP Temp Temp Source Heart Rate Resp SpO2 Height Weight   138/87 98.2 °F (36.8 °C) Oral 87 16 100 % 6' (1.829 m) 165 lb 8 oz (75.1 kg)       Physical Exam  Constitutional:       Appearance: Normal appearance. HENT:      Mouth/Throat:      Mouth: Mucous membranes are moist.      Pharynx: Oropharynx is clear. Pulmonary:      Effort: Pulmonary effort is normal.      Breath sounds: No wheezing or rhonchi. Neurological:      Mental Status: He is alert. DIAGNOSTIC RESULTS   LABS:    Labs Reviewed   RAPID INFLUENZA A/B ANTIGENS   COVID-19       When ordered only abnormal lab results are displayed. All other labs were within normal range or not returned as of this dictation.     EKG:     RADIOLOGY:   Non-plain film images such as CT, Ultrasound and MRI are read by the radiologist. Plain radiographic images are visualized and preliminarily interpreted by the ED Provider with the below findings:    Do not appreciate any acute cardiopulmonary disease. No obvious abnormality osseous structures as interpreted by me. Interpretation per the Radiologist below, if available at the time of this note:    Discussed with Radiologist:     XR CHEST PORTABLE   Final Result      No evidence for acute cardiopulmonary disease. XR CHEST PORTABLE    Result Date: 1/26/2023  EXAM: PORTABLE AP CHEST X-RAY, 1/26/2023 INDICATION: cough, congestion COMPARISON: 12/15/2021 FINDINGS: HEART / MEDIASTINUM: Normal in size. LUNGS/PLEURA: No dense focal consolidation, pulmonary venous congestion, pleural effusion or pneumothorax. BONES / SOFT TISSUES: No acute abnormality. OTHER: None. No evidence for acute cardiopulmonary disease. No results found. PROCEDURES   Unless otherwise noted below, none     Procedures    CRITICAL CARE TIME (.cct)       PAST MEDICAL HISTORY      has a past medical history of H/O keloid of skin, Hyperlipidemia, Insulin pump status, and Type 1 diabetes mellitus with diabetic neuropathy ([facility] [address] EMERGENCY DEPARTMENT COURSE and DIFFERENTIAL DIAGNOSIS/MDM:   Vitals:    Vitals:    01/26/23 1814   BP: 138/87   Pulse: 87   Resp: 16   Temp: 98.2 °F (36.8 °C)   TempSrc: Oral   SpO2: 100%   Weight: 165 lb 8 oz (75.1 kg)   Height: 6' (1.829 m)       Patient was given the following medications:  Medications - No data to display          Is this patient to be included in the SEP-1 Core Measure due to severe sepsis or septic shock? No   Exclusion criteria - the patient is NOT to be included for SEP-1 Core Measure due to:   Infection is not suspected    CC/HPI Summary, DDx, ED Course, and Reassessment: Differential diagnoses: Influenza, Other viral illness, Meningitis, Group A strep, Airway Obstruction, Pneumonia, Hypoxemia, Dehydration, other. 69-year-old male presents the ED for evaluation of nasal congestion and cough. On exam vital signs within normal limits. Lungs clear to auscultation. Chest x-ray that acute cardiopulmonary disease. Flu and COVID-negative. Discussed with patient that he likely has a viral etiology of symptoms. Discussed additional symptomatic treatment. CONSULTS: (Who and What was discussed)  None          Chronic Conditions:     Records Reviewed (source and summary):     Disposition Considerations (include 1 Tests not done, Admit vs D/C, Shared Decision Making, Pt Expectation of Test or Tx.): Consider obtaining RSV swab this would not change the patient's disposition. Admission to the hospital considered but patient's symptoms are improving. Vital signs and testing performed is reassuring. Based on this patient is appropriate for outpatient management. No indication for admission at this time. Symptomatic treatment with expectant management discussed with the patient and/or family member or surrogates present and they are amenable to treatment plan and outpatient follow-up. Strict return precautions were discussed with the patient and those present. All parties involved were informed that condition may persist or worsen in which case they may then require inpatient treatment, currently there is no indication. They demonstrated understanding of when to return to the emergency department for new or worsening symptoms. I am the Primary Clinician of Record. FINAL IMPRESSION      1.  Acute cough          DISPOSITION/PLAN     DISPOSITION Decision To Discharge 01/26/2023 07:43:08 PM      PATIENT REFERRED TO:  Nighat Ott MD  [address] [place]  [phone]    Schedule an appointment as soon as possible for a visit   As needed      DISCHARGE MEDICATIONS:  Discharge Medication List as of 1/26/2023  7:57 PM        START taking these medications Details   guaiFENesin-dextromethorphan (ROBITUSSIN DM) 100-10 MG/5ML syrup Take 5 mLs by mouth 3 times daily as needed for Cough, Disp-120 mL, R-0Normal      pseudoephedrine (DECONGESTANT) 30 MG tablet Take 1 tablet by mouth every 6 hours as needed for Congestion, Disp-12 tablet, R-1Normal             DISCONTINUED MEDICATIONS:  Discharge Medication List as of 1/26/2023  7:57 PM        STOP taking these medications       cephALEXin (KEFLEX) 500 MG capsule Comments:   Reason for Stopping:         ibuprofen (ADVIL;MOTRIN) 600 MG tablet Comments:   Reason for Stopping:         GAVILYTE-C 240 g solution Comments:   Reason for Stopping:         sulfamethoxazole-trimethoprim (BACTRIM DS;SEPTRA DS) 800-160 MG per tablet Comments:   Reason for Stopping:                      (Please note that portions of this note were completed with a voice recognition program.  Efforts were made to edit the dictations but occasionally words are mis-transcribed.)    Barb Blanco MD (electronically signed)            Barb Blanco MD  01/27/23 9695

## (undated) DEVICE — FORCEPS BX L240CM JAW DIA2.8MM L CAP W/ NDL MIC MESH TOOTH